# Patient Record
Sex: MALE | URBAN - METROPOLITAN AREA
[De-identification: names, ages, dates, MRNs, and addresses within clinical notes are randomized per-mention and may not be internally consistent; named-entity substitution may affect disease eponyms.]

---

## 2017-09-16 ENCOUNTER — HOSPITAL ENCOUNTER (OUTPATIENT)
Dept: MEDSURG UNIT | Facility: HOSPITAL | Age: 42
End: 2017-09-18
Attending: INTERNAL MEDICINE | Admitting: INTERNAL MEDICINE

## 2017-09-16 LAB
ABS NEUT (OLG): 1.99 X10(3)/MCL
ALBUMIN SERPL-MCNC: 3.7 GM/DL (ref 3.4–5)
ALBUMIN/GLOB SERPL: 1 RATIO (ref 1–2)
ALP SERPL-CCNC: 71 UNIT/L (ref 45–117)
ALT SERPL-CCNC: 39 UNIT/L (ref 12–78)
ANISOCYTOSIS BLD QL SMEAR: ABNORMAL
AST SERPL-CCNC: 59 UNIT/L (ref 15–37)
BASOPHILS NFR BLD MANUAL: 3 %
BILIRUB SERPL-MCNC: 0.8 MG/DL (ref 0.2–1)
BILIRUBIN DIRECT+TOT PNL SERPL-MCNC: 0.3 MG/DL
BILIRUBIN DIRECT+TOT PNL SERPL-MCNC: 0.5 MG/DL
BUN SERPL-MCNC: 3 MG/DL (ref 7–18)
BUN SERPL-MCNC: 4 MG/DL (ref 7–18)
CALCIUM SERPL-MCNC: 8 MG/DL (ref 8.5–10.1)
CALCIUM SERPL-MCNC: 8.1 MG/DL (ref 8.5–10.1)
CHLORIDE SERPL-SCNC: 105 MMOL/L (ref 98–107)
CHLORIDE SERPL-SCNC: 106 MMOL/L (ref 98–107)
CO2 SERPL-SCNC: 22 MMOL/L (ref 21–32)
CO2 SERPL-SCNC: 25 MMOL/L (ref 21–32)
COLOR STL: NORMAL
CONSISTENCY STL: NORMAL
CREAT SERPL-MCNC: 0.6 MG/DL (ref 0.6–1.3)
CREAT SERPL-MCNC: 0.6 MG/DL (ref 0.6–1.3)
CROSSMATCH INTERPRETATION: NORMAL
CROSSMATCH INTERPRETATION: NORMAL
EOSINOPHIL NFR BLD MANUAL: 7 %
ERYTHROCYTE [DISTWIDTH] IN BLOOD BY AUTOMATED COUNT: 23.7 % (ref 11.5–14.5)
GLOBULIN SER-MCNC: 3.7 GM/ML (ref 2.3–3.5)
GLUCOSE SERPL-MCNC: 101 MG/DL (ref 74–106)
GLUCOSE SERPL-MCNC: 112 MG/DL (ref 74–106)
GRANULOCYTES NFR BLD MANUAL: 41 % (ref 43–75)
HCT VFR BLD AUTO: 19.8 % (ref 40–51)
HEMOCCULT SP1 STL QL: NEGATIVE
HGB BLD-MCNC: 4.6 GM/DL (ref 13.5–17.5)
HYPOCHROMIA BLD QL SMEAR: ABNORMAL
LYMPHOCYTES NFR BLD MANUAL: 34 % (ref 20.5–51.1)
MCH RBC QN AUTO: 12.9 PG (ref 26–34)
MCHC RBC AUTO-ENTMCNC: 23.2 GM/DL (ref 31–37)
MCV RBC AUTO: 55.6 FL (ref 80–100)
MONOCYTES NFR BLD MANUAL: 13 % (ref 2–9)
MYELOCYTES NFR BLD MANUAL: 1 %
NEUTS BAND NFR BLD MANUAL: 1 % (ref 0–10)
PLATELET # BLD AUTO: 184 X10(3)/MCL (ref 130–400)
PLATELET # BLD EST: ADEQUATE 10*3/UL
PMV BLD AUTO: 8.6 FL (ref 7.4–10.4)
POIKILOCYTOSIS BLD QL SMEAR: ABNORMAL
POLYCHROMASIA BLD QL SMEAR: ABNORMAL
POTASSIUM SERPL-SCNC: 3.8 MMOL/L (ref 3.5–5.1)
POTASSIUM SERPL-SCNC: 3.9 MMOL/L (ref 3.5–5.1)
PRODUCT READY: NORMAL
PROT SERPL-MCNC: 7.4 GM/DL (ref 6.4–8.2)
RBC # BLD AUTO: 3.56 X10(6)/MCL (ref 4.5–5.9)
RBC MORPH BLD: ABNORMAL
SODIUM SERPL-SCNC: 137 MMOL/L (ref 136–145)
SODIUM SERPL-SCNC: 138 MMOL/L (ref 136–145)
TRANSFUSION ORDER: NORMAL
TRANSFUSION ORDER: NORMAL
WBC # SPEC AUTO: 4.6 X10(3)/MCL (ref 4.5–11)

## 2017-09-17 LAB
ABS NEUT (OLG): 2.06 X10(3)/MCL (ref 2.1–9.2)
ALBUMIN SERPL-MCNC: 3.6 GM/DL (ref 3.4–5)
ALBUMIN/GLOB SERPL: 1 RATIO (ref 1–2)
ALP SERPL-CCNC: 66 UNIT/L (ref 45–117)
ALT SERPL-CCNC: 34 UNIT/L (ref 12–78)
AST SERPL-CCNC: 30 UNIT/L (ref 15–37)
BASOPHILS # BLD AUTO: 0.06 X10(3)/MCL
BASOPHILS NFR BLD AUTO: 2 % (ref 0–1)
BILIRUB SERPL-MCNC: 1.9 MG/DL (ref 0.2–1)
BILIRUBIN DIRECT+TOT PNL SERPL-MCNC: 0.7 MG/DL
BILIRUBIN DIRECT+TOT PNL SERPL-MCNC: 1.2 MG/DL
BUN SERPL-MCNC: 5 MG/DL (ref 7–18)
CALCIUM SERPL-MCNC: 8.5 MG/DL (ref 8.5–10.1)
CHLORIDE SERPL-SCNC: 107 MMOL/L (ref 98–107)
CO2 SERPL-SCNC: 26 MMOL/L (ref 21–32)
COLOR STL: NORMAL
CONSISTENCY STL: NORMAL
CREAT SERPL-MCNC: 0.5 MG/DL (ref 0.6–1.3)
EOSINOPHIL # BLD AUTO: 0.15 X10(3)/MCL
EOSINOPHIL NFR BLD AUTO: 4 % (ref 0–5)
ERYTHROCYTE [DISTWIDTH] IN BLOOD BY AUTOMATED COUNT: 28.8 % (ref 11.5–14.5)
FERRITIN SERPL-MCNC: 3.8 NG/ML (ref 26–388)
GLOBULIN SER-MCNC: 3.5 GM/ML (ref 2.3–3.5)
GLUCOSE SERPL-MCNC: 74 MG/DL (ref 74–106)
HAV IGM SERPL QL IA: NONREACTIVE
HBV CORE IGM SERPL QL IA: NONREACTIVE
HBV SURFACE AG SERPL QL IA: NEGATIVE
HCT VFR BLD AUTO: 26.1 % (ref 40–51)
HCT VFR BLD AUTO: 27.6 % (ref 40–51)
HCV AB SERPL QL IA: NONREACTIVE
HEMOCCULT SP2 STL QL: NEGATIVE
HGB BLD-MCNC: 6.9 GM/DL (ref 13.5–17.5)
HGB BLD-MCNC: 7.6 GM/DL (ref 13.5–17.5)
HIV 1+2 AB+HIV1 P24 AG SERPL QL IA: NONREACTIVE
IMM GRANULOCYTES # BLD AUTO: 0.06 10*3/UL
IMM GRANULOCYTES NFR BLD AUTO: 2 %
IRON SATN MFR SERPL: 6.4 % (ref 15–50)
IRON SERPL-MCNC: 26 MCG/DL (ref 65–175)
LYMPHOCYTES # BLD AUTO: 0.86 X10(3)/MCL
LYMPHOCYTES NFR BLD AUTO: 22 % (ref 15–40)
MCH RBC QN AUTO: 16.1 PG (ref 26–34)
MCHC RBC AUTO-ENTMCNC: 26.4 GM/DL (ref 31–37)
MCV RBC AUTO: 61 FL (ref 80–100)
MONOCYTES # BLD AUTO: 0.63 X10(3)/MCL
MONOCYTES NFR BLD AUTO: 16 % (ref 4–12)
NEUTROPHILS # BLD AUTO: 2.06 X10(3)/MCL
NEUTROPHILS NFR BLD AUTO: 54 X10(3)/MCL
PLATELET # BLD AUTO: 179 X10(3)/MCL (ref 130–400)
PMV BLD AUTO: 8.7 FL (ref 7.4–10.4)
POTASSIUM SERPL-SCNC: 3.6 MMOL/L (ref 3.5–5.1)
PROT SERPL-MCNC: 7.1 GM/DL (ref 6.4–8.2)
RBC # BLD AUTO: 4.28 X10(6)/MCL (ref 4.5–5.9)
RET# (OHS): 0.04 X10(6)/MCL (ref 0.02–0.09)
RETICULOCYTE COUNT AUTOMATED (OLG): 1 % (ref 0.5–1.5)
SODIUM SERPL-SCNC: 143 MMOL/L (ref 136–145)
TIBC SERPL-MCNC: 405 MCG/DL (ref 250–450)
TRANSFERRIN SERPL-MCNC: 321 MG/DL (ref 200–360)
WBC # SPEC AUTO: 3.8 X10(3)/MCL (ref 4.5–11)

## 2017-09-18 LAB
ALBUMIN SERPL-MCNC: 3.3 GM/DL (ref 3.4–5)
ALBUMIN/GLOB SERPL: 1 RATIO (ref 1–2)
ALP SERPL-CCNC: 65 UNIT/L (ref 45–117)
ALT SERPL-CCNC: 29 UNIT/L (ref 12–78)
AST SERPL-CCNC: 24 UNIT/L (ref 15–37)
BILIRUB SERPL-MCNC: 1.5 MG/DL (ref 0.2–1)
BILIRUBIN DIRECT+TOT PNL SERPL-MCNC: 0.5 MG/DL
BILIRUBIN DIRECT+TOT PNL SERPL-MCNC: 1 MG/DL
BUN SERPL-MCNC: 6 MG/DL (ref 7–18)
CALCIUM SERPL-MCNC: 8.5 MG/DL (ref 8.5–10.1)
CHLORIDE SERPL-SCNC: 105 MMOL/L (ref 98–107)
CO2 SERPL-SCNC: 28 MMOL/L (ref 21–32)
CREAT SERPL-MCNC: 0.6 MG/DL (ref 0.6–1.3)
GLOBULIN SER-MCNC: 3.3 GM/ML (ref 2.3–3.5)
GLUCOSE SERPL-MCNC: 90 MG/DL (ref 74–106)
POTASSIUM SERPL-SCNC: 3.8 MMOL/L (ref 3.5–5.1)
PROT SERPL-MCNC: 6.6 GM/DL (ref 6.4–8.2)
SODIUM SERPL-SCNC: 139 MMOL/L (ref 136–145)

## 2017-09-28 ENCOUNTER — HISTORICAL (OUTPATIENT)
Dept: INTERNAL MEDICINE | Facility: CLINIC | Age: 42
End: 2017-09-28

## 2017-09-28 LAB
ANISOCYTOSIS BLD QL SMEAR: ABNORMAL
BASOPHILS NFR BLD MANUAL: 5 %
EOSINOPHIL NFR BLD MANUAL: 4 %
ERYTHROCYTE [DISTWIDTH] IN BLOOD BY AUTOMATED COUNT: 31.7 % (ref 11.5–14.5)
FOLATE SERPL-MCNC: 11.8 NG/ML (ref 3.1–17.5)
GRANULOCYTES NFR BLD MANUAL: 51 % (ref 43–75)
HCT VFR BLD AUTO: 31.5 % (ref 40–51)
HGB BLD-MCNC: 8.6 GM/DL (ref 13.5–17.5)
HYPOCHROMIA BLD QL SMEAR: ABNORMAL
LYMPHOCYTES NFR BLD MANUAL: 32 % (ref 20.5–51.1)
MCH RBC QN AUTO: 18.6 PG (ref 26–34)
MCHC RBC AUTO-ENTMCNC: 27.3 GM/DL (ref 31–37)
MCV RBC AUTO: 68 FL (ref 80–100)
MONOCYTES NFR BLD MANUAL: 8 % (ref 2–9)
PLATELET # BLD AUTO: 322 X10(3)/MCL (ref 130–400)
PLATELET # BLD EST: ADEQUATE 10*3/UL
PMV BLD AUTO: 8.5 FL (ref 7.4–10.4)
POIKILOCYTOSIS BLD QL SMEAR: ABNORMAL
POLYCHROMASIA BLD QL SMEAR: ABNORMAL
RBC # BLD AUTO: 4.63 X10(6)/MCL (ref 4.5–5.9)
RBC MORPH BLD: ABNORMAL
VIT B12 SERPL-MCNC: 847 PG/ML (ref 193–986)
WBC # SPEC AUTO: 4.4 X10(3)/MCL (ref 4.5–11)

## 2017-10-24 ENCOUNTER — HISTORICAL (OUTPATIENT)
Dept: ENDOSCOPY | Facility: HOSPITAL | Age: 42
End: 2017-10-24

## 2017-12-04 LAB
COLOR STL: NORMAL
CONSISTENCY STL: NORMAL
HEMOCCULT SP1 STL QL: NEGATIVE

## 2017-12-05 LAB
COLOR STL: NORMAL
CONSISTENCY STL: NORMAL
HEMOCCULT SP2 STL QL: NEGATIVE

## 2017-12-06 LAB
COLOR STL: NORMAL
CONSISTENCY STL: NORMAL

## 2017-12-07 ENCOUNTER — HISTORICAL (OUTPATIENT)
Dept: INTERNAL MEDICINE | Facility: CLINIC | Age: 42
End: 2017-12-07

## 2017-12-07 LAB
ABS NEUT (OLG): 3.22 X10(3)/MCL (ref 2.1–9.2)
ANISOCYTOSIS BLD QL SMEAR: NORMAL
BASOPHILS NFR BLD MANUAL: 0 %
EOSINOPHIL NFR BLD MANUAL: 2 %
ERYTHROCYTE [DISTWIDTH] IN BLOOD BY AUTOMATED COUNT: 24.2 % (ref 11.5–14.5)
FERRITIN SERPL-MCNC: 17.4 NG/ML (ref 26–388)
GRANULOCYTES NFR BLD MANUAL: 57 % (ref 43–75)
HCT VFR BLD AUTO: 39.5 % (ref 40–51)
HGB BLD-MCNC: 12.1 GM/DL (ref 13.5–17.5)
HYPOCHROMIA BLD QL SMEAR: NORMAL
LYMPHOCYTES NFR BLD MANUAL: 38 % (ref 20.5–51.1)
MCH RBC QN AUTO: 19.5 PG (ref 26–34)
MCHC RBC AUTO-ENTMCNC: 30.6 GM/DL (ref 31–37)
MCV RBC AUTO: 63.8 FL (ref 80–100)
MICROCYTES BLD QL SMEAR: NORMAL
MONOCYTES NFR BLD MANUAL: 2 % (ref 2–9)
NEUTS BAND NFR BLD MANUAL: 1 % (ref 0–10)
PLATELET # BLD AUTO: 112 X10(3)/MCL (ref 130–400)
PLATELET # BLD EST: ADEQUATE 10*3/UL
PMV BLD AUTO: ABNORMAL FL (ref 7.4–10.4)
POIKILOCYTOSIS BLD QL SMEAR: NORMAL
RBC # BLD AUTO: 6.19 X10(6)/MCL (ref 4.5–5.9)
RBC MORPH BLD: NORMAL
WBC # SPEC AUTO: 4.8 X10(3)/MCL (ref 4.5–11)

## 2017-12-28 ENCOUNTER — HISTORICAL (OUTPATIENT)
Dept: ADMINISTRATIVE | Facility: HOSPITAL | Age: 42
End: 2017-12-28

## 2017-12-28 LAB
ABS NEUT (OLG): 4.13 X10(3)/MCL (ref 2.1–9.2)
ALBUMIN SERPL-MCNC: 4 GM/DL (ref 3.4–5)
ALBUMIN/GLOB SERPL: 1 RATIO (ref 1–2)
ALP SERPL-CCNC: 94 UNIT/L (ref 45–117)
ALT SERPL-CCNC: 39 UNIT/L (ref 12–78)
APPEARANCE, UA: CLEAR
AST SERPL-CCNC: 29 UNIT/L (ref 15–37)
BACTERIA #/AREA URNS AUTO: ABNORMAL /[HPF]
BASOPHILS # BLD AUTO: 0.07 X10(3)/MCL
BASOPHILS NFR BLD AUTO: 1 % (ref 0–1)
BILIRUB SERPL-MCNC: 0.6 MG/DL (ref 0.2–1)
BILIRUB UR QL STRIP: NEGATIVE
BILIRUBIN DIRECT+TOT PNL SERPL-MCNC: 0.2 MG/DL
BILIRUBIN DIRECT+TOT PNL SERPL-MCNC: 0.4 MG/DL
BUN SERPL-MCNC: 10 MG/DL (ref 7–18)
CALCIUM SERPL-MCNC: 8.9 MG/DL (ref 8.5–10.1)
CHLORIDE SERPL-SCNC: 103 MMOL/L (ref 98–107)
CHOLEST SERPL-MCNC: 202 MG/DL
CHOLEST/HDLC SERPL: 2.8 {RATIO} (ref 0–5)
CO2 SERPL-SCNC: 29 MMOL/L (ref 21–32)
COLOR UR: YELLOW
CREAT SERPL-MCNC: 0.8 MG/DL (ref 0.6–1.3)
DEPRECATED CALCIDIOL+CALCIFEROL SERPL-MC: 46.91 NG/ML (ref 30–80)
EOSINOPHIL # BLD AUTO: 0.05 X10(3)/MCL
EOSINOPHIL NFR BLD AUTO: 1 % (ref 0–5)
ERYTHROCYTE [DISTWIDTH] IN BLOOD BY AUTOMATED COUNT: 23.2 % (ref 11.5–14.5)
EST. AVERAGE GLUCOSE BLD GHB EST-MCNC: 100 MG/DL
FERRITIN SERPL-MCNC: 4.7 NG/ML (ref 26–388)
GLOBULIN SER-MCNC: 4.2 GM/ML (ref 2.3–3.5)
GLUCOSE (UA): NORMAL
GLUCOSE SERPL-MCNC: 121 MG/DL (ref 74–106)
HBA1C MFR BLD: 5.1 % (ref 4.2–6.3)
HCT VFR BLD AUTO: 40.1 % (ref 40–51)
HDLC SERPL-MCNC: 71 MG/DL
HGB BLD-MCNC: 12.2 GM/DL (ref 13.5–17.5)
HGB UR QL STRIP: NEGATIVE
HYALINE CASTS #/AREA URNS LPF: ABNORMAL /[LPF]
IMM GRANULOCYTES # BLD AUTO: 0.02 10*3/UL
IMM GRANULOCYTES NFR BLD AUTO: 0 %
IRON SATN MFR SERPL: 9.3 % (ref 15–50)
IRON SERPL-MCNC: 45 MCG/DL (ref 65–175)
KETONES UR QL STRIP: NEGATIVE
LDLC SERPL CALC-MCNC: 102 MG/DL (ref 0–130)
LEUKOCYTE ESTERASE UR QL STRIP: NEGATIVE
LYMPHOCYTES # BLD AUTO: 1.38 X10(3)/MCL
LYMPHOCYTES NFR BLD AUTO: 22 % (ref 15–40)
MCH RBC QN AUTO: 20.2 PG (ref 26–34)
MCHC RBC AUTO-ENTMCNC: 30.4 GM/DL (ref 31–37)
MCV RBC AUTO: 66.4 FL (ref 80–100)
MONOCYTES # BLD AUTO: 0.64 X10(3)/MCL
MONOCYTES NFR BLD AUTO: 10 % (ref 4–12)
NEUTROPHILS # BLD AUTO: 4.13 X10(3)/MCL
NEUTROPHILS NFR BLD AUTO: 66 X10(3)/MCL
NITRITE UR QL STRIP: NEGATIVE
PH UR STRIP: 6.5 [PH] (ref 4.5–8)
PLATELET # BLD AUTO: 377 X10(3)/MCL (ref 130–400)
PMV BLD AUTO: 8.7 FL (ref 7.4–10.4)
POTASSIUM SERPL-SCNC: 3.9 MMOL/L (ref 3.5–5.1)
PROT SERPL-MCNC: 8.2 GM/DL (ref 6.4–8.2)
PROT UR QL STRIP: 300 MG/DL
RBC # BLD AUTO: 6.04 X10(6)/MCL (ref 4.5–5.9)
RBC #/AREA URNS AUTO: ABNORMAL /[HPF]
SODIUM SERPL-SCNC: 138 MMOL/L (ref 136–145)
SP GR UR STRIP: 1.01 (ref 1–1.03)
SQUAMOUS #/AREA URNS LPF: ABNORMAL /[LPF]
T4 SERPL-MCNC: 8.2 MCG/DL (ref 4.5–12.1)
TIBC SERPL-MCNC: 482 MCG/DL (ref 250–450)
TRANSFERRIN SERPL-MCNC: 343 MG/DL (ref 200–360)
TRIGL SERPL-MCNC: 147 MG/DL
TSH SERPL-ACNC: 2.4 MIU/L (ref 0.36–3.74)
UROBILINOGEN UR STRIP-ACNC: NORMAL
VIT B12 SERPL-MCNC: 657 PG/ML (ref 193–986)
VLDLC SERPL CALC-MCNC: 29 MG/DL
WBC # SPEC AUTO: 6.3 X10(3)/MCL (ref 4.5–11)
WBC #/AREA URNS AUTO: ABNORMAL /HPF

## 2018-01-02 ENCOUNTER — HISTORICAL (OUTPATIENT)
Dept: INTERNAL MEDICINE | Facility: CLINIC | Age: 43
End: 2018-01-02

## 2018-01-02 LAB
COLOR STL: ABNORMAL
CONSISTENCY STL: ABNORMAL
HEMOCCULT SP1 STL QL: POSITIVE

## 2018-03-05 ENCOUNTER — HISTORICAL (OUTPATIENT)
Dept: INTERNAL MEDICINE | Facility: CLINIC | Age: 43
End: 2018-03-05

## 2018-03-05 LAB
ABS NEUT (OLG): 3.9 X10(3)/MCL (ref 2.1–9.2)
BASOPHILS # BLD AUTO: 0.09 X10(3)/MCL
BASOPHILS NFR BLD AUTO: 1 %
EOSINOPHIL # BLD AUTO: 0.37 10*3/UL
EOSINOPHIL NFR BLD AUTO: 5 %
ERYTHROCYTE [DISTWIDTH] IN BLOOD BY AUTOMATED COUNT: 18.6 % (ref 11.5–14.5)
FERRITIN SERPL-MCNC: 3.2 NG/ML (ref 26–388)
HCT VFR BLD AUTO: 36.9 % (ref 40–51)
HGB BLD-MCNC: 11.1 GM/DL (ref 13.5–17.5)
IMM GRANULOCYTES # BLD AUTO: 0.02 10*3/UL
IMM GRANULOCYTES NFR BLD AUTO: 0 %
IRON SATN MFR SERPL: 3.2 % (ref 15–50)
IRON SERPL-MCNC: 15 MCG/DL (ref 65–175)
LYMPHOCYTES # BLD AUTO: 2.41 X10(3)/MCL
LYMPHOCYTES NFR BLD AUTO: 31 % (ref 13–40)
MCH RBC QN AUTO: 20 PG (ref 26–34)
MCHC RBC AUTO-ENTMCNC: 30.1 GM/DL (ref 31–37)
MCV RBC AUTO: 66.4 FL (ref 80–100)
MONOCYTES # BLD AUTO: 0.88 X10(3)/MCL
MONOCYTES NFR BLD AUTO: 12 % (ref 4–12)
NEUTROPHILS # BLD AUTO: 3.9 X10(3)/MCL
NEUTROPHILS NFR BLD AUTO: 51 X10(3)/MCL
PLATELET # BLD AUTO: 371 X10(3)/MCL (ref 130–400)
PMV BLD AUTO: 9.3 FL (ref 7.4–10.4)
RBC # BLD AUTO: 5.56 X10(6)/MCL (ref 4.5–5.9)
TIBC SERPL-MCNC: 465 MCG/DL (ref 250–450)
TRANSFERRIN SERPL-MCNC: 337 MG/DL (ref 200–360)
WBC # SPEC AUTO: 7.7 X10(3)/MCL (ref 4.5–11)

## 2018-06-25 ENCOUNTER — HISTORICAL (OUTPATIENT)
Dept: INTERNAL MEDICINE | Facility: CLINIC | Age: 43
End: 2018-06-25

## 2018-06-25 LAB
ABS NEUT (OLG): 1.53 X10(3)/MCL (ref 2.1–9.2)
BASOPHILS # BLD AUTO: 0.08 X10(3)/MCL
BASOPHILS NFR BLD AUTO: 2 %
EOSINOPHIL # BLD AUTO: 0.2 X10(3)/MCL
EOSINOPHIL NFR BLD AUTO: 5 %
ERYTHROCYTE [DISTWIDTH] IN BLOOD BY AUTOMATED COUNT: 19 % (ref 11.5–14.5)
FERRITIN SERPL-MCNC: 2.6 NG/ML (ref 26–388)
HCT VFR BLD AUTO: 32 % (ref 40–51)
HGB BLD-MCNC: 8.5 GM/DL (ref 13.5–17.5)
IMM GRANULOCYTES # BLD AUTO: 0.01 10*3/UL
IMM GRANULOCYTES NFR BLD AUTO: 0 %
IRON SATN MFR SERPL: 2.3 % (ref 15–50)
IRON SERPL-MCNC: 11 MCG/DL (ref 65–175)
LYMPHOCYTES # BLD AUTO: 1.49 X10(3)/MCL
LYMPHOCYTES NFR BLD AUTO: 38 % (ref 13–40)
MCH RBC QN AUTO: 16.5 PG (ref 26–34)
MCHC RBC AUTO-ENTMCNC: 26.6 GM/DL (ref 31–37)
MCV RBC AUTO: 62 FL (ref 80–100)
MONOCYTES # BLD AUTO: 0.56 X10(3)/MCL
MONOCYTES NFR BLD AUTO: 14 % (ref 4–12)
NEUTROPHILS # BLD AUTO: 1.53 X10(3)/MCL
NEUTROPHILS NFR BLD AUTO: 39 X10(3)/MCL
PLATELET # BLD AUTO: 354 X10(3)/MCL (ref 130–400)
PMV BLD AUTO: 8.5 FL (ref 7.4–10.4)
RBC # BLD AUTO: 5.16 X10(6)/MCL (ref 4.5–5.9)
TIBC SERPL-MCNC: 484 MCG/DL (ref 250–450)
TRANSFERRIN SERPL-MCNC: 378 MG/DL (ref 200–360)
WBC # SPEC AUTO: 3.9 X10(3)/MCL (ref 4.5–11)

## 2018-08-10 ENCOUNTER — HISTORICAL (OUTPATIENT)
Dept: INTERNAL MEDICINE | Facility: CLINIC | Age: 43
End: 2018-08-10

## 2018-08-10 LAB
ABS NEUT (OLG): 3.08 X10(3)/MCL (ref 2.1–9.2)
ANISOCYTOSIS BLD QL SMEAR: ABNORMAL
BASOPHILS NFR BLD MANUAL: 2 %
EOSINOPHIL NFR BLD MANUAL: 0 %
ERYTHROCYTE [DISTWIDTH] IN BLOOD BY AUTOMATED COUNT: ABNORMAL % (ref 11.5–14.5)
GRANULOCYTES NFR BLD MANUAL: 56 % (ref 43–75)
HCT VFR BLD AUTO: 40 % (ref 40–51)
HGB BLD-MCNC: 12 GM/DL (ref 13.5–17.5)
HYPOCHROMIA BLD QL SMEAR: ABNORMAL
LYMPHOCYTES NFR BLD MANUAL: 26 % (ref 20.5–51.1)
MCH RBC QN AUTO: 22.5 PG (ref 26–34)
MCHC RBC AUTO-ENTMCNC: 30 GM/DL (ref 31–37)
MCV RBC AUTO: 74.9 FL (ref 80–100)
MONOCYTES NFR BLD MANUAL: 6 % (ref 2–9)
NEUTS BAND NFR BLD MANUAL: 10 % (ref 0–10)
PLATELET # BLD AUTO: 150 X10(3)/MCL (ref 130–400)
PLATELET # BLD EST: ADEQUATE 10*3/UL
PMV BLD AUTO: ABNORMAL FL (ref 7.4–10.4)
RBC # BLD AUTO: 5.34 X10(6)/MCL (ref 4.5–5.9)
RBC MORPH BLD: ABNORMAL
WBC # SPEC AUTO: 5 X10(3)/MCL (ref 4.5–11)

## 2018-10-16 ENCOUNTER — HISTORICAL (OUTPATIENT)
Dept: INTERNAL MEDICINE | Facility: CLINIC | Age: 43
End: 2018-10-16

## 2018-10-16 LAB
ABS NEUT (OLG): 2.89 X10(3)/MCL (ref 2.1–9.2)
BASOPHILS # BLD AUTO: 0.06 X10(3)/MCL
BASOPHILS NFR BLD AUTO: 1 %
EOSINOPHIL # BLD AUTO: 0.13 X10(3)/MCL
EOSINOPHIL NFR BLD AUTO: 2 %
ERYTHROCYTE [DISTWIDTH] IN BLOOD BY AUTOMATED COUNT: 19.4 % (ref 11.5–14.5)
HCT VFR BLD AUTO: 46.8 % (ref 40–51)
HGB BLD-MCNC: 15.1 GM/DL (ref 13.5–17.5)
IMM GRANULOCYTES # BLD AUTO: 0.01 10*3/UL
IMM GRANULOCYTES NFR BLD AUTO: 0 %
LYMPHOCYTES # BLD AUTO: 1.52 X10(3)/MCL
LYMPHOCYTES NFR BLD AUTO: 28 % (ref 13–40)
MCH RBC QN AUTO: 26.8 PG (ref 26–34)
MCHC RBC AUTO-ENTMCNC: 32.3 GM/DL (ref 31–37)
MCV RBC AUTO: 83 FL (ref 80–100)
MONOCYTES # BLD AUTO: 0.74 X10(3)/MCL
MONOCYTES NFR BLD AUTO: 14 % (ref 4–12)
NEUTROPHILS # BLD AUTO: 2.89 X10(3)/MCL
NEUTROPHILS NFR BLD AUTO: 54 X10(3)/MCL
PLATELET # BLD AUTO: 185 X10(3)/MCL (ref 130–400)
PMV BLD AUTO: 8.9 FL (ref 7.4–10.4)
RBC # BLD AUTO: 5.64 X10(6)/MCL (ref 4.5–5.9)
WBC # SPEC AUTO: 5.4 X10(3)/MCL (ref 4.5–11)

## 2019-04-02 ENCOUNTER — HISTORICAL (OUTPATIENT)
Dept: INTERNAL MEDICINE | Facility: CLINIC | Age: 44
End: 2019-04-02

## 2019-04-02 LAB
ABS NEUT (OLG): 2.91 X10(3)/MCL (ref 2.1–9.2)
ALBUMIN SERPL-MCNC: 3.8 GM/DL (ref 3.4–5)
ALBUMIN/GLOB SERPL: 0.9 RATIO (ref 1.1–2)
ALP SERPL-CCNC: 125 UNIT/L (ref 45–117)
ALT SERPL-CCNC: 76 UNIT/L (ref 12–78)
APPEARANCE, UA: CLEAR
AST SERPL-CCNC: 94 UNIT/L (ref 15–37)
BACTERIA #/AREA URNS AUTO: ABNORMAL /[HPF]
BASOPHILS # BLD AUTO: 0.07 X10(3)/MCL
BASOPHILS NFR BLD AUTO: 1 %
BILIRUB SERPL-MCNC: 0.7 MG/DL (ref 0.2–1)
BILIRUB UR QL STRIP: NEGATIVE
BILIRUBIN DIRECT+TOT PNL SERPL-MCNC: 0.3 MG/DL
BILIRUBIN DIRECT+TOT PNL SERPL-MCNC: 0.4 MG/DL
BUN SERPL-MCNC: 9 MG/DL (ref 7–18)
CALCIUM SERPL-MCNC: 9 MG/DL (ref 8.5–10.1)
CHLORIDE SERPL-SCNC: 105 MMOL/L (ref 98–107)
CHOLEST SERPL-MCNC: 224 MG/DL
CHOLEST/HDLC SERPL: 2.2 {RATIO} (ref 0–5)
CO2 SERPL-SCNC: 27 MMOL/L (ref 21–32)
COLOR UR: YELLOW
CREAT SERPL-MCNC: 0.8 MG/DL (ref 0.6–1.3)
DEPRECATED CALCIDIOL+CALCIFEROL SERPL-MC: 20.35 NG/ML (ref 30–80)
EOSINOPHIL # BLD AUTO: 0.05 X10(3)/MCL
EOSINOPHIL NFR BLD AUTO: 1 %
ERYTHROCYTE [DISTWIDTH] IN BLOOD BY AUTOMATED COUNT: 23.8 % (ref 11.5–14.5)
EST. AVERAGE GLUCOSE BLD GHB EST-MCNC: 74 MG/DL
FERRITIN SERPL-MCNC: 56.9 NG/ML (ref 26–388)
GLOBULIN SER-MCNC: 4.4 GM/ML (ref 2.3–3.5)
GLUCOSE (UA): NORMAL
GLUCOSE SERPL-MCNC: 108 MG/DL (ref 74–106)
HBA1C MFR BLD: 4.2 % (ref 4.2–6.3)
HCT VFR BLD AUTO: 36.5 % (ref 40–51)
HDLC SERPL-MCNC: 101 MG/DL
HGB BLD-MCNC: 10.3 GM/DL (ref 13.5–17.5)
HGB UR QL STRIP: NEGATIVE
HYALINE CASTS #/AREA URNS LPF: ABNORMAL /[LPF]
IMM GRANULOCYTES # BLD AUTO: 0.02 10*3/UL
IMM GRANULOCYTES NFR BLD AUTO: 0 %
IRON SATN MFR SERPL: 2.9 % (ref 15–50)
IRON SERPL-MCNC: 12 MCG/DL (ref 65–175)
KETONES UR QL STRIP: ABNORMAL
LDLC SERPL CALC-MCNC: 108 MG/DL (ref 0–130)
LEUKOCYTE ESTERASE UR QL STRIP: NEGATIVE
LYMPHOCYTES # BLD AUTO: 1.02 X10(3)/MCL
LYMPHOCYTES NFR BLD AUTO: 21 % (ref 13–40)
MCH RBC QN AUTO: 20.4 PG (ref 26–34)
MCHC RBC AUTO-ENTMCNC: 28.2 GM/DL (ref 31–37)
MCV RBC AUTO: 72.1 FL (ref 80–100)
MONOCYTES # BLD AUTO: 0.77 X10(3)/MCL
MONOCYTES NFR BLD AUTO: 16 % (ref 4–12)
NEUTROPHILS # BLD AUTO: 2.91 X10(3)/MCL
NEUTROPHILS NFR BLD AUTO: 60 X10(3)/MCL
NITRITE UR QL STRIP: NEGATIVE
PH UR STRIP: 6.5 [PH] (ref 4.5–8)
PLATELET # BLD AUTO: 260 X10(3)/MCL (ref 130–400)
PMV BLD AUTO: 8 FL (ref 7.4–10.4)
POTASSIUM SERPL-SCNC: 3.9 MMOL/L (ref 3.5–5.1)
PROT SERPL-MCNC: 8.2 GM/DL (ref 6.4–8.2)
PROT UR QL STRIP: >600 MG/DL
RBC # BLD AUTO: 5.06 X10(6)/MCL (ref 4.5–5.9)
RBC #/AREA URNS AUTO: ABNORMAL /[HPF]
SODIUM SERPL-SCNC: 140 MMOL/L (ref 136–145)
SP GR UR STRIP: 1.02 (ref 1–1.03)
SQUAMOUS #/AREA URNS LPF: ABNORMAL /[LPF]
TIBC SERPL-MCNC: 408 MCG/DL (ref 250–450)
TRANSFERRIN SERPL-MCNC: 306 MG/DL (ref 200–360)
TRIGL SERPL-MCNC: 77 MG/DL
TSH SERPL-ACNC: 1.85 MIU/L (ref 0.36–3.74)
UROBILINOGEN UR STRIP-ACNC: NORMAL
VLDLC SERPL CALC-MCNC: 15 MG/DL
WBC # SPEC AUTO: 4.8 X10(3)/MCL (ref 4.5–11)
WBC #/AREA URNS AUTO: ABNORMAL /HPF

## 2019-07-02 ENCOUNTER — HISTORICAL (OUTPATIENT)
Dept: ADMINISTRATIVE | Facility: HOSPITAL | Age: 44
End: 2019-07-02

## 2019-07-02 ENCOUNTER — HOSPITAL ENCOUNTER (OUTPATIENT)
Dept: MEDSURG UNIT | Facility: HOSPITAL | Age: 44
End: 2019-07-03
Attending: INTERNAL MEDICINE | Admitting: INTERNAL MEDICINE

## 2019-07-02 LAB
ABS NEUT (OLG): 2.23 X10(3)/MCL (ref 2.1–9.2)
ALBUMIN SERPL-MCNC: 3.2 GM/DL (ref 3.4–5)
ALBUMIN/GLOB SERPL: 0.8 RATIO (ref 1.1–2)
ALP SERPL-CCNC: 108 UNIT/L (ref 45–117)
ALT SERPL-CCNC: 65 UNIT/L (ref 12–78)
AMMONIA PLAS-MSCNC: 20 MMOL/L (ref 11–32)
AMPHET UR QL SCN: NEGATIVE
AMYLASE SERPL-CCNC: 104 UNIT/L (ref 25–115)
APPEARANCE, UA: CLEAR
APTT PPP: 31.2 SECOND(S) (ref 23.3–37)
AST SERPL-CCNC: 94 UNIT/L (ref 15–37)
BACTERIA #/AREA URNS AUTO: ABNORMAL /[HPF]
BARBITURATE SCN PRESENT UR: NEGATIVE
BASOPHILS # BLD AUTO: 0.07 X10(3)/MCL
BASOPHILS NFR BLD AUTO: 2 %
BENZODIAZ UR QL SCN: NEGATIVE
BILIRUB SERPL-MCNC: 0.6 MG/DL (ref 0.2–1)
BILIRUB UR QL STRIP: NEGATIVE
BILIRUBIN DIRECT+TOT PNL SERPL-MCNC: 0.2 MG/DL
BILIRUBIN DIRECT+TOT PNL SERPL-MCNC: 0.4 MG/DL
BUN SERPL-MCNC: 4 MG/DL (ref 7–18)
CALCIUM SERPL-MCNC: 8.2 MG/DL (ref 8.5–10.1)
CANNABINOIDS UR QL SCN: NEGATIVE
CHLORIDE SERPL-SCNC: 102 MMOL/L (ref 98–107)
CHOLEST SERPL-MCNC: 197 MG/DL
CHOLEST/HDLC SERPL: 1.7 {RATIO} (ref 0–5)
CO2 SERPL-SCNC: 27 MMOL/L (ref 21–32)
COCAINE UR QL SCN: NEGATIVE
COLOR STL: ABNORMAL
COLOR UR: ABNORMAL
CONSISTENCY STL: ABNORMAL
CREAT SERPL-MCNC: 0.6 MG/DL (ref 0.6–1.3)
CROSSMATCH INTERPRETATION: NORMAL
DEPRECATED CALCIDIOL+CALCIFEROL SERPL-MC: 22.58 NG/ML (ref 30–80)
EOSINOPHIL # BLD AUTO: 0.02 X10(3)/MCL
EOSINOPHIL NFR BLD AUTO: 1 %
ERYTHROCYTE [DISTWIDTH] IN BLOOD BY AUTOMATED COUNT: 22.7 % (ref 11.5–14.5)
ERYTHROCYTE [DISTWIDTH] IN BLOOD BY AUTOMATED COUNT: 27 % (ref 11.5–14.5)
ETHANOL SERPL-MCNC: 49 MG/DL
FERRITIN SERPL-MCNC: 4.4 NG/ML (ref 26–388)
FOLATE SERPL-MCNC: 15.5 NG/ML (ref 3.1–17.5)
GLOBULIN SER-MCNC: 3.9 GM/ML (ref 2.3–3.5)
GLUCOSE (UA): NORMAL
GLUCOSE SERPL-MCNC: 129 MG/DL (ref 74–106)
HAV IGM SERPL QL IA: NONREACTIVE
HBV CORE IGM SERPL QL IA: NONREACTIVE
HBV SURFACE AG SERPL QL IA: NEGATIVE
HCT VFR BLD AUTO: 27.1 % (ref 40–51)
HCT VFR BLD AUTO: 31.8 % (ref 40–51)
HCV AB SERPL QL IA: NONREACTIVE
HDLC SERPL-MCNC: 113 MG/DL
HEMOCCULT SP1 STL QL: POSITIVE
HGB BLD-MCNC: 6.8 GM/DL (ref 13.5–17.5)
HGB BLD-MCNC: 8.7 GM/DL (ref 13.5–17.5)
HGB UR QL STRIP: NEGATIVE
HYALINE CASTS #/AREA URNS LPF: ABNORMAL /[LPF]
IMM GRANULOCYTES # BLD AUTO: 0.02 10*3/UL
IMM GRANULOCYTES NFR BLD AUTO: 1 %
INR PPP: 1.11 (ref 0.9–1.2)
IRON SATN MFR SERPL: 3.1 % (ref 15–50)
IRON SERPL-MCNC: 11 MCG/DL (ref 65–175)
KETONES UR QL STRIP: ABNORMAL
LDLC SERPL CALC-MCNC: 70 MG/DL (ref 0–130)
LEUKOCYTE ESTERASE UR QL STRIP: NEGATIVE
LIPASE SERPL-CCNC: 198 UNIT/L (ref 73–393)
LYMPHOCYTES # BLD AUTO: 0.56 X10(3)/MCL
LYMPHOCYTES NFR BLD AUTO: 16 % (ref 13–40)
MAGNESIUM SERPL-MCNC: 1.4 MG/DL (ref 1.6–2.6)
MCH RBC QN AUTO: 16 PG (ref 26–34)
MCH RBC QN AUTO: 18.6 PG (ref 26–34)
MCHC RBC AUTO-ENTMCNC: 25.1 GM/DL (ref 31–37)
MCHC RBC AUTO-ENTMCNC: 27.4 GM/DL (ref 31–37)
MCV RBC AUTO: 63.8 FL (ref 80–100)
MCV RBC AUTO: 68.1 FL (ref 80–100)
MONOCYTES # BLD AUTO: 0.56 X10(3)/MCL
MONOCYTES NFR BLD AUTO: 16 % (ref 4–12)
NEUTROPHILS # BLD AUTO: 2.23 X10(3)/MCL
NEUTROPHILS NFR BLD AUTO: 64 X10(3)/MCL
NITRITE UR QL STRIP: NEGATIVE
OPIATES UR QL SCN: NEGATIVE
PCP UR QL: NEGATIVE
PH UR STRIP.AUTO: 8 [PH] (ref 5–8)
PH UR STRIP: 8 [PH] (ref 4.5–8)
PLATELET # BLD AUTO: 135 X10(3)/MCL (ref 130–400)
PLATELET # BLD AUTO: 166 X10(3)/MCL (ref 130–400)
PMV BLD AUTO: 8.1 FL (ref 7.4–10.4)
PMV BLD AUTO: 8.2 FL (ref 7.4–10.4)
POTASSIUM SERPL-SCNC: 3.8 MMOL/L (ref 3.5–5.1)
PRODUCT READY: NORMAL
PROT SERPL-MCNC: 7.1 GM/DL (ref 6.4–8.2)
PROT UR QL STRIP: 300 MG/DL
PROTHROMBIN TIME: 14.2 SECOND(S) (ref 11.9–14.4)
RBC # BLD AUTO: 4.25 X10(6)/MCL (ref 4.5–5.9)
RBC # BLD AUTO: 4.67 X10(6)/MCL (ref 4.5–5.9)
RBC #/AREA URNS AUTO: ABNORMAL /[HPF]
RET# (OHS): 0.14 X10(6)/MCL (ref 0.02–0.09)
RETICULOCYTE COUNT AUTOMATED (OLG): 3.2 % (ref 0.5–1.5)
SODIUM SERPL-SCNC: 137 MMOL/L (ref 136–145)
SP GR UR STRIP: 1 (ref 1–1.03)
SQUAMOUS #/AREA URNS LPF: ABNORMAL /[LPF]
TEMPERATURE, URINE (OHS): 25 DEGC (ref 20–25)
TIBC SERPL-MCNC: 359 MCG/DL (ref 250–450)
TRANSFERRIN SERPL-MCNC: 273 MG/DL (ref 200–360)
TRANSFUSION ORDER: NORMAL
TRIGL SERPL-MCNC: 71 MG/DL
UROBILINOGEN UR STRIP-ACNC: NORMAL
VIT B12 SERPL-MCNC: 918 PG/ML (ref 193–986)
VLDLC SERPL CALC-MCNC: 14 MG/DL
WBC # SPEC AUTO: 3.5 X10(3)/MCL (ref 4.5–11)
WBC # SPEC AUTO: 3.7 X10(3)/MCL (ref 4.5–11)
WBC #/AREA URNS AUTO: ABNORMAL /HPF

## 2019-07-03 LAB
ABS NEUT (OLG): 2.66 X10(3)/MCL (ref 2.1–9.2)
ALBUMIN SERPL-MCNC: 2.9 GM/DL (ref 3.4–5)
ALBUMIN/GLOB SERPL: 0.8 RATIO (ref 1.1–2)
ALP SERPL-CCNC: 93 UNIT/L (ref 45–117)
ALT SERPL-CCNC: 52 UNIT/L (ref 12–78)
AST SERPL-CCNC: 65 UNIT/L (ref 15–37)
BASOPHILS # BLD AUTO: 0.07 X10(3)/MCL
BASOPHILS NFR BLD AUTO: 2 %
BILIRUB SERPL-MCNC: 1.1 MG/DL (ref 0.2–1)
BILIRUBIN DIRECT+TOT PNL SERPL-MCNC: 0.4 MG/DL
BILIRUBIN DIRECT+TOT PNL SERPL-MCNC: 0.7 MG/DL
BUN SERPL-MCNC: 4 MG/DL (ref 7–18)
CALCIUM SERPL-MCNC: 8.4 MG/DL (ref 8.5–10.1)
CHLORIDE SERPL-SCNC: 105 MMOL/L (ref 98–107)
CO2 SERPL-SCNC: 27 MMOL/L (ref 21–32)
CREAT SERPL-MCNC: 0.5 MG/DL (ref 0.6–1.3)
CREAT UR-MCNC: 48 MG/DL
EOSINOPHIL # BLD AUTO: 0.06 10*3/UL
EOSINOPHIL NFR BLD AUTO: 2 %
ERYTHROCYTE [DISTWIDTH] IN BLOOD BY AUTOMATED COUNT: 26.6 % (ref 11.5–14.5)
GLOBULIN SER-MCNC: 3.5 GM/ML (ref 2.3–3.5)
GLUCOSE SERPL-MCNC: 109 MG/DL (ref 74–106)
HCT VFR BLD AUTO: 32.7 % (ref 40–51)
HGB BLD-MCNC: 9.1 GM/DL (ref 13.5–17.5)
IMM GRANULOCYTES # BLD AUTO: 0.02 10*3/UL
IMM GRANULOCYTES NFR BLD AUTO: 0 %
LYMPHOCYTES # BLD AUTO: 0.58 X10(3)/MCL
LYMPHOCYTES NFR BLD AUTO: 15 % (ref 13–40)
MAGNESIUM SERPL-MCNC: 2 MG/DL (ref 1.6–2.6)
MCH RBC QN AUTO: 18.5 PG (ref 26–34)
MCHC RBC AUTO-ENTMCNC: 27.8 GM/DL (ref 31–37)
MCV RBC AUTO: 66.6 FL (ref 80–100)
MONOCYTES # BLD AUTO: 0.58 X10(3)/MCL
MONOCYTES NFR BLD AUTO: 15 % (ref 4–12)
NEUTROPHILS # BLD AUTO: 2.66 X10(3)/MCL
NEUTROPHILS NFR BLD AUTO: 67 X10(3)/MCL
PLATELET # BLD AUTO: 155 X10(3)/MCL (ref 130–400)
PMV BLD AUTO: 8.5 FL (ref 7.4–10.4)
POTASSIUM SERPL-SCNC: 3.6 MMOL/L (ref 3.5–5.1)
PROT SERPL-MCNC: 6.4 GM/DL (ref 6.4–8.2)
PROT UR STRIP-MCNC: 246.9 MG/DL
PROT/CREAT UR-RTO: 5143.8 MG/GM
RBC # BLD AUTO: 4.91 X10(6)/MCL (ref 4.5–5.9)
SODIUM SERPL-SCNC: 138 MMOL/L (ref 136–145)
WBC # SPEC AUTO: 4 X10(3)/MCL (ref 4.5–11)

## 2021-08-25 ENCOUNTER — HISTORICAL (OUTPATIENT)
Dept: ADMINISTRATIVE | Facility: HOSPITAL | Age: 46
End: 2021-08-25

## 2021-08-25 LAB
ABS NEUT (OLG): 1.81 X10(3)/MCL (ref 2.1–9.2)
ALBUMIN SERPL-MCNC: 3.8 GM/DL (ref 3.5–5)
ALBUMIN/GLOB SERPL: 0.9 RATIO (ref 1.1–2)
ALP SERPL-CCNC: 112 UNIT/L (ref 40–150)
ALT SERPL-CCNC: 24 UNIT/L (ref 0–55)
APPEARANCE, UA: CLEAR
AST SERPL-CCNC: 58 UNIT/L (ref 5–34)
BACTERIA #/AREA URNS AUTO: ABNORMAL /HPF
BASOPHILS # BLD AUTO: 0.1 X10(3)/MCL (ref 0–0.2)
BASOPHILS NFR BLD AUTO: 3 %
BILIRUB SERPL-MCNC: 0.6 MG/DL
BILIRUB UR QL STRIP: NEGATIVE
BILIRUBIN DIRECT+TOT PNL SERPL-MCNC: 0.3 MG/DL (ref 0–0.5)
BILIRUBIN DIRECT+TOT PNL SERPL-MCNC: 0.3 MG/DL (ref 0–0.8)
BUN SERPL-MCNC: 3.7 MG/DL (ref 8.9–20.6)
CALCIUM SERPL-MCNC: 9.1 MG/DL (ref 8.4–10.2)
CHLORIDE SERPL-SCNC: 105 MMOL/L (ref 98–107)
CO2 SERPL-SCNC: 25 MMOL/L (ref 22–29)
COLOR UR: ABNORMAL
CREAT SERPL-MCNC: 0.8 MG/DL (ref 0.73–1.18)
CREAT UR-MCNC: 42.5 MG/DL (ref 58–161)
EOSINOPHIL # BLD AUTO: 0.1 X10(3)/MCL (ref 0–0.9)
EOSINOPHIL NFR BLD AUTO: 2 %
ERYTHROCYTE [DISTWIDTH] IN BLOOD BY AUTOMATED COUNT: ABNORMAL % (ref 11.5–14.5)
FERRITIN SERPL-MCNC: 29.7 NG/ML (ref 21.81–274.66)
GLOBULIN SER-MCNC: 4.3 GM/DL (ref 2.4–3.5)
GLUCOSE (UA): NEGATIVE
GLUCOSE SERPL-MCNC: 98 MG/DL (ref 74–100)
HCT VFR BLD AUTO: 39 % (ref 40–51)
HGB BLD-MCNC: 11.9 GM/DL (ref 13.5–17.5)
HGB UR QL STRIP: 0.03 MG/DL
HYALINE CASTS #/AREA URNS LPF: ABNORMAL /LPF
IMM GRANULOCYTES # BLD AUTO: 0.02 10*3/UL
IMM GRANULOCYTES NFR BLD AUTO: 0 %
IRON SATN MFR SERPL: 53 % (ref 20–50)
IRON SERPL-MCNC: 172 UG/DL (ref 65–175)
KETONES UR QL STRIP: NEGATIVE
LEUKOCYTE ESTERASE UR QL STRIP: NEGATIVE
LYMPHOCYTES # BLD AUTO: 1.6 X10(3)/MCL (ref 0.6–4.6)
LYMPHOCYTES NFR BLD AUTO: 40 %
MCH RBC QN AUTO: 22.6 PG (ref 26–34)
MCHC RBC AUTO-ENTMCNC: 30.5 GM/DL (ref 31–37)
MCV RBC AUTO: 74.1 FL (ref 80–100)
MONOCYTES # BLD AUTO: 0.4 X10(3)/MCL (ref 0.1–1.3)
MONOCYTES NFR BLD AUTO: 9 %
NEUTROPHILS # BLD AUTO: 1.81 X10(3)/MCL (ref 2.1–9.2)
NEUTROPHILS NFR BLD AUTO: 46 %
NITRITE UR QL STRIP: NEGATIVE
NRBC BLD AUTO-RTO: 0 % (ref 0–0.2)
PH UR STRIP: 5.5 [PH] (ref 4.5–8)
PLATELET # BLD AUTO: 101 X10(3)/MCL (ref 130–400)
PMV BLD AUTO: 8.4 FL (ref 7.4–10.4)
POTASSIUM SERPL-SCNC: 3.9 MMOL/L (ref 3.5–5.1)
PROT SERPL-MCNC: 8.1 GM/DL (ref 6.4–8.3)
PROT UR QL STRIP: 200 MG/DL
PROT UR STRIP-MCNC: 192.9 MG/DL
PROT/CREAT UR-RTO: 4538.8 MG/GM CR
RBC # BLD AUTO: 5.26 X10(6)/MCL (ref 4.5–5.9)
RBC #/AREA URNS AUTO: ABNORMAL /HPF
SODIUM SERPL-SCNC: 142 MMOL/L (ref 136–145)
SP GR UR STRIP: 1 (ref 1–1.03)
SQUAMOUS #/AREA URNS LPF: ABNORMAL /LPF
TIBC SERPL-MCNC: 153 UG/DL (ref 69–240)
TIBC SERPL-MCNC: 325 UG/DL (ref 250–450)
TRANSFERRIN SERPL-MCNC: 269 MG/DL (ref 174–364)
UROBILINOGEN UR STRIP-ACNC: NORMAL
WBC # SPEC AUTO: 4 X10(3)/MCL (ref 4.5–11)
WBC #/AREA URNS AUTO: ABNORMAL /HPF

## 2021-10-11 ENCOUNTER — HISTORICAL (OUTPATIENT)
Dept: NEPHROLOGY | Facility: CLINIC | Age: 46
End: 2021-10-11

## 2021-10-11 LAB
ALBUMIN SERPL-MCNC: 3.5 GM/DL (ref 3.5–5)
ALBUMIN/GLOB SERPL: 0.8 RATIO (ref 1.1–2)
ALP SERPL-CCNC: 152 UNIT/L (ref 40–150)
ALT SERPL-CCNC: 45 UNIT/L (ref 0–55)
AST SERPL-CCNC: 103 UNIT/L (ref 5–34)
BILIRUB SERPL-MCNC: 0.5 MG/DL
BILIRUBIN DIRECT+TOT PNL SERPL-MCNC: 0.2 MG/DL (ref 0–0.8)
BILIRUBIN DIRECT+TOT PNL SERPL-MCNC: 0.3 MG/DL (ref 0–0.5)
BUN SERPL-MCNC: 5.6 MG/DL (ref 8.9–20.6)
CALCIUM SERPL-MCNC: 8.9 MG/DL (ref 8.4–10.2)
CHLORIDE SERPL-SCNC: 102 MMOL/L (ref 98–107)
CO2 SERPL-SCNC: 25 MMOL/L (ref 22–29)
CREAT SERPL-MCNC: 0.64 MG/DL (ref 0.73–1.18)
GLOBULIN SER-MCNC: 4.3 GM/DL (ref 2.4–3.5)
GLUCOSE SERPL-MCNC: 101 MG/DL (ref 74–100)
POTASSIUM SERPL-SCNC: 3.9 MMOL/L (ref 3.5–5.1)
PROT SERPL-MCNC: 7.8 GM/DL (ref 6.4–8.3)
SODIUM SERPL-SCNC: 138 MMOL/L (ref 136–145)

## 2021-11-11 ENCOUNTER — HOSPITAL ENCOUNTER (OUTPATIENT)
Dept: MEDSURG UNIT | Facility: HOSPITAL | Age: 46
End: 2021-11-12
Attending: INTERNAL MEDICINE | Admitting: INTERNAL MEDICINE

## 2021-11-11 LAB
ABS NEUT (OLG): 0.97 X10(3)/MCL (ref 2.1–9.2)
ALBUMIN SERPL-MCNC: 3.8 GM/DL (ref 3.5–5)
ALBUMIN/GLOB SERPL: 1 RATIO (ref 1.1–2)
ALP SERPL-CCNC: 174 UNIT/L (ref 40–150)
ALT SERPL-CCNC: 52 UNIT/L (ref 0–55)
AST SERPL-CCNC: 109 UNIT/L (ref 5–34)
BASOPHILS # BLD AUTO: 0.1 X10(3)/MCL (ref 0–0.2)
BASOPHILS NFR BLD AUTO: 3 %
BILIRUB SERPL-MCNC: 0.5 MG/DL
BILIRUBIN DIRECT+TOT PNL SERPL-MCNC: 0.2 MG/DL (ref 0–0.8)
BILIRUBIN DIRECT+TOT PNL SERPL-MCNC: 0.3 MG/DL (ref 0–0.5)
BUN SERPL-MCNC: 9.8 MG/DL (ref 8.9–20.6)
CALCIUM SERPL-MCNC: 9.2 MG/DL (ref 8.7–10.5)
CHLORIDE SERPL-SCNC: 92 MMOL/L (ref 98–107)
CO2 SERPL-SCNC: 21 MMOL/L (ref 22–29)
CREAT SERPL-MCNC: 0.73 MG/DL (ref 0.73–1.18)
D DIMER PPP IA.FEU-MCNC: 0.86 MCG/ML FEU
EOSINOPHIL # BLD AUTO: 0.1 X10(3)/MCL (ref 0–0.9)
EOSINOPHIL NFR BLD AUTO: 3 %
ERYTHROCYTE [DISTWIDTH] IN BLOOD BY AUTOMATED COUNT: 19 % (ref 11.5–14.5)
GLOBULIN SER-MCNC: 4 GM/DL (ref 2.4–3.5)
GLUCOSE SERPL-MCNC: 97 MG/DL (ref 74–100)
HCT VFR BLD AUTO: 29.3 % (ref 40–51)
HGB BLD-MCNC: 9.7 GM/DL (ref 13.5–17.5)
IMM GRANULOCYTES # BLD AUTO: 0.01 10*3/UL
IMM GRANULOCYTES NFR BLD AUTO: 0 %
LYMPHOCYTES # BLD AUTO: 1.7 X10(3)/MCL (ref 0.6–4.6)
LYMPHOCYTES NFR BLD AUTO: 54 %
MCH RBC QN AUTO: 21.9 PG (ref 26–34)
MCHC RBC AUTO-ENTMCNC: 33.1 GM/DL (ref 31–37)
MCV RBC AUTO: 66.3 FL (ref 80–100)
MONOCYTES # BLD AUTO: 0.3 X10(3)/MCL (ref 0.1–1.3)
MONOCYTES NFR BLD AUTO: 9 %
NEUTROPHILS # BLD AUTO: 0.97 X10(3)/MCL (ref 2.1–9.2)
NEUTROPHILS NFR BLD AUTO: 31 %
NRBC BLD AUTO-RTO: 0 % (ref 0–0.2)
PLATELET # BLD AUTO: 135 X10(3)/MCL (ref 130–400)
PMV BLD AUTO: 8.3 FL (ref 7.4–10.4)
POTASSIUM SERPL-SCNC: 3.7 MMOL/L (ref 3.5–5.1)
PROT SERPL-MCNC: 7.8 GM/DL (ref 6.4–8.3)
RBC # BLD AUTO: 4.42 X10(6)/MCL (ref 4.5–5.9)
SARS-COV-2 AG RESP QL IA.RAPID: NEGATIVE
SODIUM SERPL-SCNC: 126 MMOL/L (ref 136–145)
TROPONIN I SERPL-MCNC: 0.03 NG/ML (ref 0–0.04)
WBC # SPEC AUTO: 3.1 X10(3)/MCL (ref 4.5–11)

## 2021-11-12 LAB
ABS NEUT (OLG): 1.15 X10(3)/MCL (ref 2.1–9.2)
ALBUMIN SERPL-MCNC: 3.8 GM/DL (ref 3.5–5)
ALBUMIN/GLOB SERPL: 0.9 RATIO (ref 1.1–2)
ALP SERPL-CCNC: 126 UNIT/L (ref 40–150)
ALT SERPL-CCNC: 57 UNIT/L (ref 0–55)
AST SERPL-CCNC: 146 UNIT/L (ref 5–34)
BASOPHILS # BLD AUTO: 0.1 X10(3)/MCL (ref 0–0.2)
BASOPHILS NFR BLD AUTO: 2 %
BILIRUB SERPL-MCNC: 0.7 MG/DL
BILIRUBIN DIRECT+TOT PNL SERPL-MCNC: 0.3 MG/DL (ref 0–0.8)
BILIRUBIN DIRECT+TOT PNL SERPL-MCNC: 0.4 MG/DL (ref 0–0.5)
BUN SERPL-MCNC: 8.4 MG/DL (ref 8.9–20.6)
CALCIUM SERPL-MCNC: 9.4 MG/DL (ref 8.7–10.5)
CHLORIDE SERPL-SCNC: 97 MMOL/L (ref 98–107)
CK MB SERPL-MCNC: 1 NG/ML
CK MB SERPL-MCNC: 1.6 NG/ML
CK MB SERPL-MCNC: 2 NG/ML
CK SERPL-CCNC: 100 U/L (ref 30–200)
CK SERPL-CCNC: 142 U/L (ref 30–200)
CK SERPL-CCNC: 166 U/L (ref 30–200)
CO2 SERPL-SCNC: 24 MMOL/L (ref 22–29)
CREAT SERPL-MCNC: 0.77 MG/DL (ref 0.73–1.18)
EOSINOPHIL # BLD AUTO: 0 X10(3)/MCL (ref 0–0.9)
EOSINOPHIL NFR BLD AUTO: 2 %
ERYTHROCYTE [DISTWIDTH] IN BLOOD BY AUTOMATED COUNT: 19.5 % (ref 11.5–14.5)
ETHANOL SERPL-MCNC: 233 MG/DL
GLOBULIN SER-MCNC: 4.1 GM/DL (ref 2.4–3.5)
GLUCOSE SERPL-MCNC: 87 MG/DL (ref 74–100)
HCT VFR BLD AUTO: 31.5 % (ref 40–51)
HGB BLD-MCNC: 10.3 GM/DL (ref 13.5–17.5)
IMM GRANULOCYTES # BLD AUTO: 0.01 10*3/UL
IMM GRANULOCYTES NFR BLD AUTO: 0 %
LYMPHOCYTES # BLD AUTO: 0.8 X10(3)/MCL (ref 0.6–4.6)
LYMPHOCYTES NFR BLD AUTO: 34 %
MAGNESIUM SERPL-MCNC: 2.2 MG/DL (ref 1.6–2.6)
MCH RBC QN AUTO: 22.2 PG (ref 26–34)
MCHC RBC AUTO-ENTMCNC: 32.7 GM/DL (ref 31–37)
MCV RBC AUTO: 67.9 FL (ref 80–100)
MONOCYTES # BLD AUTO: 0.3 X10(3)/MCL (ref 0.1–1.3)
MONOCYTES NFR BLD AUTO: 13 %
NEUTROPHILS # BLD AUTO: 1.15 X10(3)/MCL (ref 2.1–9.2)
NEUTROPHILS NFR BLD AUTO: 48 %
NRBC BLD AUTO-RTO: 0 % (ref 0–0.2)
PHOSPHATE SERPL-MCNC: 2.8 MG/DL (ref 2.3–4.7)
PLATELET # BLD AUTO: 147 X10(3)/MCL (ref 130–400)
PMV BLD AUTO: 8.5 FL (ref 7.4–10.4)
POTASSIUM SERPL-SCNC: 4 MMOL/L (ref 3.5–5.1)
PROT SERPL-MCNC: 7.9 GM/DL (ref 6.4–8.3)
RBC # BLD AUTO: 4.64 X10(6)/MCL (ref 4.5–5.9)
SODIUM SERPL-SCNC: 133 MMOL/L (ref 136–145)
TROPONIN I SERPL-MCNC: 0.01 NG/ML (ref 0–0.04)
TROPONIN I SERPL-MCNC: 0.02 NG/ML (ref 0–0.04)
TROPONIN I SERPL-MCNC: 0.09 NG/ML (ref 0–0.04)
TROPONIN I SERPL-MCNC: <0.01 NG/ML (ref 0–0.04)
WBC # SPEC AUTO: 2.4 X10(3)/MCL (ref 4.5–11)

## 2022-02-23 ENCOUNTER — HISTORICAL (OUTPATIENT)
Dept: ENDOSCOPY | Facility: HOSPITAL | Age: 47
End: 2022-02-23

## 2022-02-23 LAB
ABS NEUT (OLG): 2.2 (ref 2.1–9.2)
ALBUMIN SERPL-MCNC: 3.6 G/DL (ref 3.5–5)
ALBUMIN/GLOB SERPL: 0.9 {RATIO} (ref 1.1–2)
ALP SERPL-CCNC: 119 U/L (ref 40–150)
ALT SERPL-CCNC: 38 U/L (ref 0–55)
AST SERPL-CCNC: 66 U/L (ref 5–34)
BASOPHILS # BLD AUTO: 0.1 10*3/UL (ref 0–0.2)
BASOPHILS NFR BLD AUTO: 2 %
BILIRUB SERPL-MCNC: 0.6 MG/DL
BILIRUBIN DIRECT+TOT PNL SERPL-MCNC: 0.3 (ref 0–0.5)
BILIRUBIN DIRECT+TOT PNL SERPL-MCNC: 0.3 (ref 0–0.8)
BUN SERPL-MCNC: 5.9 MG/DL (ref 8.9–20.6)
CALCIUM SERPL-MCNC: 9 MG/DL (ref 8.7–10.5)
CHLORIDE SERPL-SCNC: 96 MMOL/L (ref 98–107)
CO2 SERPL-SCNC: 27 MMOL/L (ref 22–29)
CREAT SERPL-MCNC: 0.69 MG/DL (ref 0.73–1.18)
EOSINOPHIL # BLD AUTO: 0 10*3/UL (ref 0–0.9)
EOSINOPHIL NFR BLD AUTO: 0 %
ERYTHROCYTE [DISTWIDTH] IN BLOOD BY AUTOMATED COUNT: 17.5 % (ref 11.5–14.5)
FLAG2 (OHS): 50
FLAG3 (OHS): 80
FLAGS (OHS): 80
GLOBULIN SER-MCNC: 4.2 G/DL (ref 2.4–3.5)
GLUCOSE SERPL-MCNC: 100 MG/DL (ref 74–100)
HCT VFR BLD AUTO: 30.1 % (ref 40–51)
HGB BLD-MCNC: 9.3 G/DL (ref 13.5–17.5)
ICTERIC INTERF INDEX SERPL-ACNC: 1
IMM GRANULOCYTES # BLD AUTO: 0.01 10*3/UL
IMM GRANULOCYTES NFR BLD AUTO: 0 %
IMM. NE 2 SUSPECT FLAG (OHS): 20
LIPEMIC INTERF INDEX SERPL-ACNC: 2
LOW EVENT # SUSPECT FLAG (OHS): 100
LYMPHOCYTES # BLD AUTO: 1.1 10*3/UL (ref 0.6–4.6)
LYMPHOCYTES NFR BLD AUTO: 28 %
MANUAL DIFF? (OHS): NO
MCH RBC QN AUTO: 21.8 PG (ref 26–34)
MCHC RBC AUTO-ENTMCNC: 30.9 G/DL (ref 31–37)
MCV RBC AUTO: 70.7 FL (ref 80–100)
MO BLASTS SUSPECT FLAG (OHS): 50
MONOCYTES # BLD AUTO: 0.4 10*3/UL (ref 0.1–1.3)
MONOCYTES NFR BLD AUTO: 12 %
NEUTROPHILS # BLD AUTO: 2.2 10*3/UL (ref 2.1–9.2)
NEUTROPHILS NFR BLD AUTO: 58 %
NRBC BLD AUTO-RTO: 0 % (ref 0–0.2)
PLATELET # BLD AUTO: 231 10*3/UL (ref 130–400)
PMV BLD AUTO: 7.9 FL (ref 7.4–10.4)
POTASSIUM SERPL-SCNC: 3.6 MMOL/L (ref 3.5–5.1)
PROT SERPL-MCNC: 7.8 G/DL (ref 6.4–8.3)
RBC # BLD AUTO: 4.26 10*6/UL (ref 4.5–5.9)
SODIUM SERPL-SCNC: 131 MMOL/L (ref 136–145)
WBC # SPEC AUTO: 3.8 10*3/UL (ref 4.5–11)

## 2022-04-11 ENCOUNTER — HISTORICAL (OUTPATIENT)
Dept: ADMINISTRATIVE | Facility: HOSPITAL | Age: 47
End: 2022-04-11

## 2022-04-24 VITALS
HEIGHT: 63 IN | BODY MASS INDEX: 22.23 KG/M2 | OXYGEN SATURATION: 96 % | WEIGHT: 125.44 LBS | SYSTOLIC BLOOD PRESSURE: 152 MMHG | DIASTOLIC BLOOD PRESSURE: 83 MMHG

## 2022-04-30 NOTE — OP NOTE
DATE OF SURGERY:    10/24/2017    SURGEON:  Larry Helm MD    attending physician:  Dr. Larry Helm MD    RESIDENT SURGEON:  Daniel Edge MD.    PROCEDURE PERFORMED:  Colonoscopy with biopsy.  Esophagogastroduodenoscopy.    PREOPERATIVE DIAGNOSIS:  Rectal bleeding, anemia, and melena.    POSTOPERATIVE DIAGNOSES:    1. Rectal and descending colon polyps and grade 3-4 internal hemorrhoids.    2. Normal esophagogastroduodenoscopy.    FINDINGS:    1. A 3 mm polyp in the distal descending, beginning sigmoid rectal area.  A 3 mm polyp in the sigmoid colon and rectum.  Grade 3-4 internal hemorrhoids.  2. Normal EGD.    COMPLICATIONS:  None.    SPECIMENS:  Descending colon, sigmoid colon and rectal polyps.    ESTIMATED BLOOD LOSS:  10 cc.    COMPLICATIONS:  None.    INDICATIONS FOR PROCEDURE:  This is a 41-year-old male who was admitted for a week in the hospital with severe microcytic anemia, melena and a hemoglobin of 4.  He was given 3 units of red cells to more closely delineate to the cause of his anemia.  He responded well to those.  He is back here for an elective colonoscopy to determine if there is any malignancy or concerning features in his colon for further evaluation.    PROCEDURE IN DETAIL:  After appropriate consents were signed, the patient was taken to the endoscopy suite.  An audible surgical time-out took place between Anesthesia, Nursing and Surgery.  The first portion of the procedure will be EGD.     The patient was placed in the left lateral decubitus position.  MAC anesthesia was initiated.  After appropriate anesthesia, the endoscope was inserted in the patient's oropharynx to evaluate the cords and the piriform sinus.  The piriform sinus was located.  The esophagus was intubated.  The scope was inserted all the way down to the squamocolumnar junction and GE junction.  During this time, 360 degrees of esophageal mucosa was evaluated and showed no erosions, esophagitis, or obvious  ulcerations.  The EG junction and lower esophageal sphincter at the squamocolumnar junction were also evaluated.  These were found to be normal.  The fundus, body and antrum of the stomach were entered and these were evaluated normal as well with no obvious erosions, after which point, the pylorus was located.  The 1st, 2nd, and 3rd portions of the duodenum were intubated and thoroughly evaluated.  There was no duodenitis.  The scope was gently removed and retroflexed to evaluate the fundus of the stomach as well as the lower esophageal sphincter and GE junction from below.  These were also found to be normal to the entire endoscopic exam.  The patient tolerated the procedure well.  There were no significant findings to account for the patient's anemia.  The scope was withdrawn.  The oropharynx was sucked out.  The patient tolerated the procedure well with no obvious complications.     After which point, the patient was turned over and the colonoscope was brought into the field.  An audible surgical time-out took place between Anesthesia, Nursing and Surgery.  The digital rectal exam showed some pretty significant grade 3-4 internal and external hemorrhoids as well, but no obvious masses palpated on digital rectal exam.  After which point, the scope was inserted into the rectum and passed all the way to the base of the cecum.     The prep was adequate to good.  After the cecum was reached the appendiceal orifice was located and the scope was slowly pulled back in a 360 degree fashion.  Overall, the colonic mucosa was relatively normal.  At the distal descending colon, proximal sigmoid colon, a 3 mm polyp was found.  This was biopsied completely.  In the middle sigmoid colon a 3 mm polyp was found and biopsied completely.  As we approached the rectum we began to see some fairly congested hemorrhoidal tissue and grade 3-4 hemorrhoids.  There was also an adjacent polyp at the distal rectum.  This was biopsied as well.   The scope was retroflexed to evaluate the hemorrhoidal tissue, which was again appreciated.  Ultimately the scope was pulled out through the anal canal, which showed no obvious masses, cancers, tumors, condylomata or papilloma.  The patient tolerated the procedure well with no obvious complications and was taken to PACU in good condition.    RECOMMENDATIONS:  I will definitely need to follow up the biopsy results from those 3 polyps, but at this point, the patient should be to be scoped in 3 years for further evaluation, considering we found 2     polyps and 1 in the distal rectum.  Refer the patient back to [facility] for further evaluation for their recommendations of these.        ______________________________  ANGELINE LLANES MD    ______________________________  MD ADRIANA Giron/HA  DD:  10/24/2017  Time:  04:21PM  DT:  10/24/2017  Time:  05:03PM  Job #:  [id number]

## 2022-04-30 NOTE — ED PROVIDER NOTES
Patient:   Alcon Taylor             MRN: [id number]            FIN: [id number]               Age:   43 years     Sex:  Male     :  1975   Associated Diagnoses:   Abnormal laboratory findings; Alcoholism, chronic; Anemia   Author:   Ivania GREEN, Bal Elias      Basic Information   Time seen: Date & time 2019 10:00:00.   History source: Patient.   Arrival mode: Private vehicle.   History limitation: Language barrier.   Additional information: Chief Complaint from Nursing Triage Note : Chief Complaint   2019 9:31 CDT        Chief Complaint           Pt sent here from clinic [facility] of low H/H.  Pt reports receiving blood but does not remember the exact date.  .      History of Present Illness   The patient presents with Anemia.  The onset was chronic.  The course/duration of symptoms is worsening.  Location: generalized. The degree at onset was moderate.  The degree at present is moderate.  Seen in clinic here, has known history of alcohol abuse, internal and external hemorrhoids, colonic polyps, previously known to be significantly anemic with iron deficiency and started on iron and vitamin C.  In the last 3 months, his hemoglobin has dropped quite a bit from 10 to just over 6 on labs this morning.  Called this morning to come in for transfusion.  He continues to work in construction, he reports that he generally feels well and is able to work, has not seen gross signs of bleeding recently, reports he continues to drink beer and admits to 8 or 9 most recently 2 days ago.  No chest pain, dyspnea, or other complaints.  Bulgarian-speaking, history obtained with the assistance of a video  and a language translation susan..     The patient presents with abnormal lab test.  Lab test value Lab test was performed by: primary care physician.  Associated symptoms: none.        Review of Systems   Constitutional symptoms:  Negative except as documented in HPI, no fever, no chills, no weakness.     Skin symptoms:  Negative except as documented in HPI, no rash, no breakdown.    Eye symptoms:  Negative except as documented in HPI, no recent vision problems, no pain.    ENMT symptoms:  Negative except as documented in HPI, no ear pain, no sore throat.    Respiratory symptoms:  Negative except as documented in HPI, no shortness of breath, no cough, no wheezing.    Cardiovascular symptoms:  Negative except as documented in HPI, no chest pain, no palpitations, no syncope.    Gastrointestinal symptoms:  Negative except as documented in HPI, no abdominal pain, no nausea, no vomiting, no diarrhea.    Genitourinary symptoms:  Negative except as documented in HPI, no dysuria, no hematuria.    Musculoskeletal symptoms:  Negative except as documented in HPI, no Muscle pain, no Joint pain.    Neurologic symptoms:  Negative except as documented in HPI, no altered level of consciousness, no weakness.    Psychiatric symptoms:  Negative except as documented in HPI, no anxiety, no depression.    Endocrine symptoms:  Negative except as documented in HPI, no polyuria, no polydipsia.    Hematologic/Lymphatic symptoms:  Negative except as documented in HPI, bleeding tendency negative, bruising tendency negative.    Allergy/immunologic symptoms:  Negative except as documented in HPI, no recurrent infections, no impaired immunity.              Additional review of systems information: All other systems reviewed and otherwise negative, All systems reviewed as documented in chart.      Health Status   Allergies:    Allergic Reactions (Selected)  No Known Allergies  No Known Medication Allergies.   Medications:  (Selected)   Inpatient Medications  Ordered  Triple Antibiotic Ointment topical: 1 susan, form: Ointment, TOP, Once, first dose 04/19/17 21:00:00 CDT, stop date 04/19/17 21:00:00 CDT, Waste Code [facility]  Prescriptions  Prescribed  Colace 100 mg oral capsule: 100 mg = 1 cap(s), Oral, BID, PRN PRN for constipation, # 60 cap(s), 6  Refill(s), Pharmacy: [facility]  Norvasc 10 mg oral tablet: 10 mg = 1 tab(s), Oral, Daily, # 30 tab(s), 6 Refill(s), Pharmacy: [facility]  Vitamin D3 50,000 intl units oral capsule: 50,000 IntUnit = 1 cap(s), Oral, qWeek, # 12 cap(s), 0 Refill(s), Pharmacy: [facility]  ascorbic acid 500 mg oral capsule: 500 mg = 1 cap(s), Oral, Daily, # 60 cap(s), 6 Refill(s), Pharmacy: [facility]  ferrous fumarate 324 mg (106 mg elemental iron) oral tablet: 324 mg = 1 tab(s), Oral, TID, # 90 tab(s), 6 Refill(s), Pharmacy: [facility]  folic acid 1 mg oral tablet: 1 mg = 1 tab(s), Oral, Daily, # 30 tab(s), 6 Refill(s), Pharmacy: [place] [id number]  hydrocortisone 25 mg rectal suppository: 25 mg = 1 supp, RI (rectal), BID, PRN PRN bleeding, # 28 supp, 6 Refill(s), Pharmacy: [facility]  losartan 25 mg oral tablet: 25 mg = 1 tab(s), Oral, Daily, # 30 tab(s), 6 Refill(s), Pharmacy: [facility]  lovastatin 10 mg oral tablet: 10 mg = 1 tab(s), Oral, Once a day (at bedtime), # 30 tab(s), 6 Refill(s), Pharmacy: [facility]  Documented Medications  Documented  Centrum Silver oral tablet: 1 tab(s), Oral, Daily, # 30 tab(s), 0 Refill(s).      Past Medical/ Family/ Social History   Medical history:    No active or resolved past medical history items have been selected or recorded..   Surgical history:    Esophagogastroduodenoscopy on 10/24/2017 at 41 Years.  Comments:  10/24/2017 12:47 KARMEN Vernon RN, Karrie AMADOR  auto-populated from documented surgical case  Colonoscopy on 10/24/2017 at 41 Years.  Comments:  10/24/2017 12:47 KARMEN Vernon RN, Karrie PADILLA.  auto-populated from documented surgical case  Biopsy Gastrointestinal on 10/24/2017 at 41 Years.  Comments:  10/24/2017 12:47 KARMEN Vernon RN, Karrie PADILLA.  auto-populated from documented surgical case  Denies..   Family history:    No family history items have been selected or recorded..   Social history:    Social & Psychosocial  Habits    Alcohol  10/02/2018  Use: Current    Type: Beer    Comment: STATES HAD 4 BEER SATURDAY - 10/02/2018 09:11 - Lori FLOWERSMaxine Tara    Employment/School  11/06/2017  Status: Employed    Description: Construction    Exercise    Comment: none - 03/08/2018 08:13 - Wally KRISTIESachia    Home/Environment  11/06/2017  Lives with: Friends    Nutrition/Health  03/08/2018  Type of diet: Regular    Caffeine intake amount: coke --2 a day    Wants to lose weight: No    Sleeping concerns: No    Feels highly stressed: No    Substance Use    Comment: denies - 04/19/2017 19:16 - Fletcher OLGUIN, Mabel CHARLES    Tobacco  07/02/2019  Use: Former smoker, quit more    Patient Wants Consult For Cessation Counseling N/A    Abuse/Neglect  07/02/2019  SHX Any signs of abuse or neglect No  .   Problem list:    Active Problems (3)  HTN (hypertension)   Tobacco user   Tobacco user   .      Physical Examination               Vital Signs   Vital Signs   7/2/2019 9:31 CDT        Temperature Oral          36.9 DegC                             Temperature Oral (calculated)             98.42 DegF                             Peripheral Pulse Rate     83 bpm                             Respiratory Rate          14 br/min                             SpO2                      100 %                             Oxygen Therapy            Room air                             Systolic Blood Pressure   158 mmHg  HI                             Diastolic Blood Pressure  82 mmHg  .   Measurements   7/2/2019 9:31 CDT        Weight Dosing             58.8 kg                             Weight Measured           58.8 kg                             Weight Measured and Calculated in Lbs     129.63 lb                             Height/Length Dosing      160.02 cm                             Height/Length Estimated   160.02 cm  .   Basic Oxygen Information   7/2/2019 9:31 CDT        SpO2                      100 %                             Oxygen Therapy             Room air  .   General:  Alert, no acute distress, Mildly tremulous.    Skin:  Warm, dry, normal for ethnicity.    Head:  Normocephalic, atraumatic.    Neck:  Supple, trachea midline, no tenderness.    Eye:  Pupils are equal, round and reactive to light, extraocular movements are intact, normal conjunctiva.    Ears, nose, mouth and throat:  Oral mucosa moist, no pharyngeal erythema or exudate.    Cardiovascular:  Regular rate and rhythm, No murmur, Normal peripheral perfusion, No edema.    Respiratory:  Lungs are clear to auscultation, respirations are non-labored, breath sounds are equal, Symmetrical chest wall expansion.    Chest wall:  No tenderness, No deformity.    Back:  Nontender, Normal range of motion, Normal alignment.    Musculoskeletal:  Normal ROM, normal strength, no tenderness, no swelling, no deformity.    Gastrointestinal:  Soft, Non distended, Normal bowel sounds, Mild bilateral upper abdominal tenderness - diffuse.    Neurological:  Alert and oriented to person, place, time, and situation, No focal neurological deficit observed, CN II-XII intact, normal motor observed, normal speech observed.    Lymphatics:  No lymphadenopathy.   Psychiatric:  Cooperative, appropriate mood & affect, normal judgment.       Medical Decision Making   Differential Diagnosis:  Electrolyte imbalance, anemia.    Documents reviewed:  Emergency department nurses' notes, emergency department records, prior records.    Results review:  Lab results : Lab View   7/2/2019 8:27 CDT        Sodium Lvl                137 mmol/L                             Potassium Lvl             3.8 mmol/L                             Chloride                  102 mmol/L                             CO2                       27 mmol/L                             Calcium Lvl               8.2 mg/dL  LOW                             Glucose Lvl               129 mg/dL  HI                             BUN                       4 mg/dL  LOW                              Creatinine                0.60 mg/dL                             eGFR-AA                   >105 mL/min                             eGFR-STEFANIE                  >105 mL/min                             Bili Total                0.6 mg/dL                             Bili Direct               0.2 mg/dL                             Bili Indirect             0.4 mg/dL                             AST                       94 unit/L  HI                             ALT                       65 unit/L                             Alk Phos                  108 unit/L                             Total Protein             7.1 gm/dL                             Albumin Lvl               3.2 gm/dL  LOW                             Globulin                  3.90 gm/mL  HI                             A/G Ratio                 0.8 ratio  LOW                             Chol                      197 mg/dL                             HDL                       113 mg/dL                             Trig                      71 mg/dL                             LDL                       70 mg/dL                             Chol/HDL                  1.7                             VLDL                      14 mg/dL                             WBC                       3.5 x10(3)/mcL  LOW                             RBC                       4.25 x10(6)/mcL  LOW                             Hgb                       6.8 gm/dL  CRIT                             Hct                       27.1 %  LOW                             Platelet                  166 x10(3)/mcL                             MCV                       63.8 fL  LOW                             MCH                       16.0 pg  LOW                             MCHC                      25.1 gm/dL  LOW                             RDW                       22.7 %  HI                             MPV                       8.2 fL                             Abs Neut                   2.23 x10(3)/mcL                             Neutro Auto               64 x10(3)/mcL  NA                             Lymph Auto                16 %                             Mono Auto                 16 %  HI                             Eos Auto                  1  NA                             Abs Eos                   0.02 x10(3)/mcL  NA                             Basophil Auto             2  NA                             Abs Neutro                2.23 x10(3)/mcL  NA                             Abs Lymph                 0.56 x10(3)/mcL  NA                             Abs Mono                  0.56 x10(3)/mcL  NA                             Abs Baso                  0.07 x10(3)/mcL  NA                             IG%                       1 %  NA                             IG#                       0.0200  NA  .      Impression and Plan   Diagnosis   Abnormal laboratory findings (PNED [id number])   Alcoholism, chronic (BSR29-WV F10.20)   Anemia (DOX76-EK D64.9)      Calls-Consults   -  7/2/2019 10:32:00 , Int.. Med./ Fam. Med., recommends Dr. Broussard/  Dr. Patel to see & admit.    Plan   Disposition: Admit time  7/2/2019 10:33:00, Place in Observation Unit, Dr. Patel - Fam. Med..

## 2022-04-30 NOTE — H&P
Patient:   Alcon Taylor             MRN: [id number]            FIN: [id number]               Age:   43 years     Sex:  Male     :  1975   Associated Diagnoses:   None   Author:   Radha Edwards DO      Chief Complaint   2019 9:31 CDT        Pt sent here from clinic bacause of low H/H.  Pt reports receiving blood but does not remember the exact date.        History of Present Illness   42yo non English speaking  male presented to ED for abnormal lab values drawn at clinic this am. H&P taken and translated by Dr. Patel. Pt was called from clinic for abnormal labs and was admitted for anemia. Pt has been followed by clinic for iron deficiency anemia, in April his Hgb was 10. Pt had episode of shortness of breath for approximately 1 minute while working last week. Pt reports back pain while working cutting wood a couple days ago. Pt denies any other symptoms and has not experienced shortness of breath or pain today. Pt has a history of alcohol abuse. Pt drinks around 12 beers daily, last drink was . Pt had colonoscopy and EGD in , an adenomatous polyp was found. No repeat colonoscopy done. Pt denies taking NSAIDS.    PMH: HTN, hospitalization for anemia w/ polyps 1yr ago  PSH: none  Allergies: nkda  Medications: amlodipine 10mg, lovastatin 10mg, losartan 25mg  Family Hx: Mom , Dad   Social Hx: Drinks about 12 beers daily, former cocaine user, former 12pack year smoker quit 1yr ago, works construction      ED course:  Pt given banana bag, Ativan 2mg PO, Ativan 4mg IV. ED ordered further labs and transfusion, labs still pending.      Review of Systems   Constitutional:  No fever, No chills, No fatigue.    Eye:  No recent visual problem.    Respiratory:  No shortness of breath.    Cardiovascular:  No chest pain, No palpitations, No peripheral edema.    Gastrointestinal:  No nausea, no vomiting, no diarrhea no constipation, No abdominal pain. No blood in  stool.  Endocrine:  No excessive thirst, No polyuria, No excessive hunger.    Musculoskeletal: Occasional back pain     Neurologic: No focal deficits. No headache.  Psychiatric: Anxiety.       Health Status   Current medications:  (Selected)   Inpatient Medications  Ordered  Triple Antibiotic Ointment topical: 1 susan, form: Ointment, TOP, Once, first dose 04/19/17 21:00:00 CDT, stop date 04/19/17 21:00:00 CDT, Waste Code BK  Prescriptions  Prescribed  Colace 100 mg oral capsule: 100 mg = 1 cap(s), Oral, BID, PRN PRN for constipation, # 60 cap(s), 6 Refill(s), Pharmacy: [facility]  Norvasc 10 mg oral tablet: 10 mg = 1 tab(s), Oral, Daily, # 30 tab(s), 6 Refill(s), Pharmacy: [facility]  Vitamin D3 50,000 intl units oral capsule: 50,000 IntUnit = 1 cap(s), Oral, qWeek, # 12 cap(s), 0 Refill(s), Pharmacy: [facility]  ascorbic acid 500 mg oral capsule: 500 mg = 1 cap(s), Oral, Daily, # 60 cap(s), 6 Refill(s), Pharmacy: [facility]  ferrous fumarate 324 mg (106 mg elemental iron) oral tablet: 324 mg = 1 tab(s), Oral, TID, # 90 tab(s), 6 Refill(s), Pharmacy: [facility]  folic acid 1 mg oral tablet: 1 mg = 1 tab(s), Oral, Daily, # 30 tab(s), 6 Refill(s), Pharmacy: [facility]  hydrocortisone 25 mg rectal suppository: 25 mg = 1 supp, MS (rectal), BID, PRN PRN bleeding, # 28 supp, 6 Refill(s), Pharmacy: [facility]  losartan 25 mg oral tablet: 25 mg = 1 tab(s), Oral, Daily, # 30 tab(s), 6 Refill(s), Pharmacy: [facility]  lovastatin 10 mg oral tablet: 10 mg = 1 tab(s), Oral, Once a day (at bedtime), # 30 tab(s), 6 Refill(s), Pharmacy: [facility]  Documented Medications  Documented  Centrum Silver oral tablet: 1 tab(s), Oral, Daily, # 30 tab(s), 0 Refill(s)      Physical Examination      Vital Signs (last 24 hrs)_____  Last Charted___________  Temp Oral     36.9 DegC  (JUL 02 09:31)  Heart Rate Peripheral   75 bpm  (JUL 02 11:21)  Resp Rate          18 br/min  (JUL 02 11:33)  SBP      H 149mmHg  (JUL 02 11:21)  DBP      81 mmHg  (JUL 02 11:21)  SpO2      99 %  (JUL 02 11:33)  Weight      58.8 kg  (JUL 02 09:31)     Measurements from flowsheet : Measurements   7/2/2019 9:31 CDT        Weight Dosing             58.8 kg                             Weight Measured           58.8 kg                             Weight Measured and Calculated in Lbs     129.63 lb                             Height/Length Dosing      160.02 cm                             Height/Length Estimated   160.02 cm     General: appears well, in no acute distress  Eye: yellow discoloration of the sclera  Neck: supple  Respiratory: clear to auscultation bilaterally  Cardiovascular: regular rate and rhythm 3/6 murmur  Gastrointestinal: soft, mild tenderness patient laughs and reports ticklish  Rectal Exam: visible hemorrhoids, no masses/nodules felt on OLY, no visible blood, -FOBT  Musculoskeletal: 5/5 strength shoulders and hips  Neurological: tremors bilateral hands         Review / Management   Labs Last 24 Hours   Chemistry  Hematology/Coagulation    Magnesium Lvl: 1.4 mg/dL Low (07/02/19 10:31:00) Retic Cnt Auto: 3.2 % High (07/02/19 10:31:00)   Amylase Lvl: 104 unit/L (07/02/19 10:31:00) RET#: 0.14 x10(6)/mcL High (07/02/19 10:31:00)   Ammonia Lvl: 20 mmol/L (07/02/19 10:31:00)    Iron Lvl: 11 mcg/dL Low (07/02/19 10:31:00)    Transferrin: 273 mg/dL (07/02/19 10:31:00)    TIBC: 359 mcg/dL (07/02/19 10:31:00)    Iron Sat: 3.1 % Low (07/02/19 10:31:00)    Ferritin Lvl: 4.4 ng/mL Low (07/02/19 10:31:00)    Folate Lvl: 15.5 ng/mL (07/02/19 10:31:00)    Lipase Lvl: 198 unit/L (07/02/19 10:31:00)    Vitamin B12 Lvl: 918 pg/mL (07/02/19 10:31:00)          Impression and Plan   42yo  male pt admitted for anemia and alcohol withdrawal.   1. Anemia   -transfusing 2 units repeat H/H on completion   -holding homes meds, continuing Amlodipine   -NPO will reassess around 1700  2. Alcohol  Withdrawal   -monitor tremors   -PRN Ativan 2mg IV    -banana bag daily   -given Mg recheck AM  3. Proteinuria   -AM urine protein creatinine ratio  4. Back Pain   -tylenol PRN    PPX: SCDs  CODE STATUS: Full code

## 2022-04-30 NOTE — ED PROVIDER NOTES
"   Patient:   Alcon Taylor             MRN: [id number]            FIN: [id number]               Age:   41 years     Sex:  Male     :  1975   Associated Diagnoses:   Leg pain; Anemia   Author:   Reinaldo Nunez      Basic Information   Time seen: Date & time 2017 13:27:00.   History source: Patient.   Arrival mode: Private vehicle.   History limitation: None.   Additional information: Chief Complaint from Nursing Triage Note : Chief Complaint   2017 13:15 CDT      Chief Complaint           40 y/o male with reports "cut self with saw 4 months ago" to right lower leg at [facility] ED, no follow up since. now c/o pain and infection to site. purulent drainage noted to dressing. denies fever  .      History of Present Illness   The patient presents with lower extremity pain and patient presents with a non-healing wound to the right lower leg at the site of a previously treated laceration.  The onset was chronic.  The course/duration of symptoms is fluctuating in intensity.  Type of injury: previously treated laceration.  The character of symptoms is pain.  The degree at present is minimal.  The exacerbating factor is walking.  The relieving factor is none.  Risk factors consist of not deep vein thrombosis, not pulmonary embolism, not anticoagulated, not diabetes mellitus, not coronary artery disease, not hypertension, not birth control pills, not immunocompromised patient, no peripheral neuropathy, not recent surgery, not pregnancy, not recent hospitalization, not recent illness/injury, no recent travel and not immobility.  Prior episodes: chronic.  Therapy today: none.  Associated symptoms: denies fever, denies chills, denies rash, denies edema, denies chest pain, denies shortness of breath and denies back pain.        Review of Systems   Constitutional symptoms:  Negative except as documented in HPI.   Skin symptoms:  Negative except as documented in HPI.   Eye symptoms:  Negative except as " documented in HPI.   ENMT symptoms:  Negative except as documented in HPI.   Respiratory symptoms:  Negative except as documented in HPI.   Cardiovascular symptoms:  Negative except as documented in HPI.   Gastrointestinal symptoms:  Negative except as documented in HPI.   Genitourinary symptoms:  Negative except as documented in HPI.   Musculoskeletal symptoms:  Reports: Lower leg, pain.   Neurologic symptoms:  Negative except as documented in HPI.   Psychiatric symptoms:  Negative except as documented in HPI.   Endocrine symptoms:  Negative except as documented in HPI.   Hematologic/Lymphatic symptoms:  Negative except as documented in HPI.   Allergy/immunologic symptoms:  Negative except as documented in HPI.             Additional review of systems information: All other systems reviewed and otherwise negative.      Health Status   Allergies:    Allergic Reactions (Selected)  No Known Medication Allergies,    Allergies (1) Active Reaction  No Known Medication Allergies None Documented  .   Medications:  (Selected)   Inpatient Medications  Ordered  Triple Antibiotic Ointment topical: 1 susan, form: Ointment, TOP, Once, first dose 04/19/17 21:00:00 CDT, stop date 04/19/17 21:00:00 CDT, Waste Code BKC  Prescriptions  Prescribed  Bactrim  mg-160 mg oral tablet: 1 tab(s), Oral, BID, drink plenty of fluids, # 14 tab(s), 0 Refill(s)  Norco 7.5 mg-325 mg oral tablet: 1 tab(s), Oral, q6hr, PRN PRN as needed for pain, not to exceed 8 tablets/day, # 12 tab(s), 0 Refill(s).   Immunizations: Per nurse's notes.      Past Medical/ Family/ Social History   Medical history:    No active or resolved past medical history items have been selected or recorded..   Surgical history:    No active procedure history items have been selected or recorded..   Family history:    No family history items have been selected or recorded..   Social history: Alcohol use: Regularly, drinks 12 servings of alcohol daily, history of alcohol abuse,  Tobacco use: Regularly, Drug use: Denies.   Problem list:    Active Problems (1)  Tobacco user   .      Physical Examination               Vital Signs   Vital Signs   9/16/2017 13:15 CDT      Temperature Oral          36.9 DegC                             Peripheral Pulse Rate     86 bpm                             Respiratory Rate          18 br/min                             SpO2                      99 %                             Oxygen Therapy            Room air                             Systolic Blood Pressure   138 mmHg                             Diastolic Blood Pressure  79 mmHg  .      Vital Signs (last 24 hrs)_____  Last Charted___________  Temp Oral     36.9 DegC  (SEP 16 13:15)  Heart Rate Peripheral   86 bpm  (SEP 16 13:15)  Resp Rate         18 br/min  (SEP 16 13:15)  SBP      138 mmHg  (SEP 16 13:15)  DBP      79 mmHg  (SEP 16 13:15)  SpO2      99 %  (SEP 16 13:15)  Weight      63.55 kg  (SEP 16 13:15)  .   Measurements   9/16/2017 13:15 CDT      Weight Dosing             63.55 kg                             Weight Measured           63.55 kg                             Weight Measured and Calculated in Lbs     140.10 lb                             Height/Length Dosing      165 cm                             Height/Length Estimated   165 cm  .   Basic Oxygen Information   9/16/2017 13:15 CDT      SpO2                      99 %                             Oxygen Therapy            Room air  .   General:  Alert, no acute distress.    Skin:  No pallor, no rash, normal for ethnicity.    Head:  Normocephalic.   Neck:  Supple, trachea midline.    Eye:  Extraocular movements are intact, normal conjunctiva.    Ears, nose, mouth and throat:  Oral mucosa moist, no pharyngeal erythema or exudate.    Cardiovascular:  Regular rate and rhythm, No edema, Arterial pulses: Bilateral, lower extremity, posterior tibial, normal.    Respiratory:  Lungs are clear to auscultation, respirations are non-labored, breath  sounds are equal, Symmetrical chest wall expansion.    Gastrointestinal:  Soft, Nontender, Non distended, Normal bowel sounds.    Back:  Nontender, Normal range of motion.    Musculoskeletal:  Normal ROM, normal strength, no swelling, no deformity, Lower extremity: Right, medial, tibia, tenderness, abrasion, erythema.    Neurological:  Alert and oriented to person, place, time, and situation, No focal neurological deficit observed, CN II-XII intact, normal sensory observed, normal motor observed.    Psychiatric:  Cooperative, appropriate mood & affect, normal judgment, non-suicidal.       Medical Decision Making   Orders  Launch Orders   Laboratory:  BMP (Order): Stat collect, 9/16/2017 13:28 CDT, Blood, Lab Collect, 9/16/2017 13:28 CDT  CBC w/ Auto Diff (Order): Now collect, 9/16/2017 13:28 CDT, Blood, Lab Collect, 9/16/2017 13:28 CDT  Radiology:  XR Tibia-Fibula Right 2 Views (Order): Stat, 9/16/2017 13:28 CDT, Pain, None, Wheelchair, Rad Type, Launch Orders   Laboratory:  CMP (Order): Stat collect, 9/16/2017 14:04 CDT, Blood, Lab Collect, 9/16/2017 14:04 CDT  CBC w/ Auto Diff (Order): Now collect, 9/16/2017 14:04 CDT, Blood, Lab Collect, 9/16/2017 14:04 CDT  Patient Care:  Saline Lock Insert (Order): 9/16/2017 14:04 CDT  Pharmacy:  Normal Saline (0.9% NS) IV 1000 mL (Order): 1,000 mL, 1,000 mL, IV, 1,000 mL/hr, start date 9/16/2017 14:04 CDT.    Results review:  Lab results : Lab View   9/16/2017 15:19 CDT      ABORh Auto Intp           O POS                             ABSC Auto Intrp           Negative                             Crossmatch                Compatible                             Crossmatch                Compatible                             Product Ready             2 LRBC Ready    9/16/2017 14:05 CDT      Sodium Lvl                138 mmol/L                             Potassium Lvl             3.9 mmol/L                             Chloride                  106 mmol/L                              CO2                       22 mmol/L                             Calcium Lvl               8.0 mg/dL  LOW                             Glucose Lvl               101 mg/dL                             BUN                       3 mg/dL  LOW                             Creatinine                0.60 mg/dL                             eGFR-AA                   >105 mL/min                             eGFR-STEFANIE                  >105 mL/min                             Bili Total                0.8 mg/dL                             Bili Direct               0.3 mg/dL                             Bili Indirect             0.5 mg/dL                             AST                       59 unit/L  HI                             ALT                       39 unit/L                             Alk Phos                  71 unit/L                             Total Protein             7.4 gm/dL                             Albumin Lvl               3.7 gm/dL                             Globulin                  3.70 gm/mL  HI                             A/G Ratio                 1 ratio                             WBC                       4.6 x10(3)/mcL                             RBC                       3.56 x10(6)/mcL  LOW                             Hgb                       4.6 gm/dL  CRIT                             Hct                       19.8 %  CRIT                             Platelet                  184 x10(3)/mcL                             MCV                       55.6 fL  LOW                             MCH                       12.9 pg  LOW                             MCHC                      23.2 gm/dL  LOW                             RDW                       23.7 %  HI                             MPV                       8.6 fL                             Abs Neut                  1.99 x10(3)/mcL  LOW                             Segs Man                  41 %  LOW                             Band Man                   1 %                             Lymph Man                 34.0 %                             Monocyte Man              13 %  HI                             Eos Man                   7 %                             Basophil Man              3 %  HI                             Myelo Man                 1 %  NA                             Hypochrom                 2+                             Platelet Est              Adequate                             Anisocyte                 2+                             Poik                      1+                             Polychrom                 1+                             RBC Morph                 Abnormal    9/16/2017 13:39 CDT      Sodium Lvl                137 mmol/L                             Potassium Lvl             3.8 mmol/L                             Chloride                  105 mmol/L                             CO2                       25 mmol/L                             Calcium Lvl               8.1 mg/dL  LOW                             Glucose Lvl               112 mg/dL  HI                             BUN                       4 mg/dL  LOW                             Creatinine                0.60 mg/dL                             eGFR-AA                   >105 mL/min                             eGFR-STEFANIE                  >105 mL/min    ,    No qualifying data available.    Radiology results:    XR Tibia-Fibula Right 2 Views Event Date: 9/16/2017 14:17:21 CDT Updated: 9/16/2017 14:23 CDT  XR Tibia-Fibula Right 2 Views  This document has an image    Reason For Exam  Pain  Radiology Report     History.  Pain.     Reference.  None Available.     Findings.  2 views of the right tibia and fibula demonstrate no fracture or  dislocation.     Impression.  No acute abnormality.     Signature Line  Electronically Signed By: Bal Peña MD  Date/Time Signed: 09/16/2017 14:21  .      Impression and Plan   Diagnosis   Leg pain (OVG68-BM  M79.606)   Anemia (HHH90-ZO D64.9)      Calls-Consults   -  2017 16:05:00 , Santana GREEN, Fabian Pascal, Internal Medicine, consult, recommends ADMIT.    Plan   Condition: Stable.    Disposition: Admit time  2017 17:30:00, Admit to Inpatient Telemetry Unit, Dispositioned by: Time: 2017 17:15:00, [facility]    Patient was given the following educational materials   Follow up with   Counseled: Patient, Regarding diagnosis, Regarding diagnostic results, Regarding treatment plan.       Addendum   I performed a face to face evaluation of this Pt, Ankita Taylor and my physical exam reveal/history revealed the followiny/o  male intially presented with non healing leg ulcer, Upon further workup, pt H/H 4.6/19.8 therefore pt needs a blood transfusion and will be admitted. Pt refused a rectal exam. Pt admits t o drinking a 12pack of beer daily. Denies rectal bleeding, hemetemesis. Amharic intrepreter used to communicate with pt history, physical, diagnosis, and treatment plan. Pt verbalized understanding. This case was initially evaluated by Reinaldo Dunlap under my supervision and I agree with his documentation, procedures and plan. I personally performed the Medical Decision Making for this patient

## 2022-04-30 NOTE — ED PROVIDER NOTES
Patient:   Alcon Taylor             MRN: [id number]            FIN: [id number]               Age:   46 years     Sex:  Male     :  1975   Associated Diagnoses:   Chest pain   Author:   Barrett Tapia MD      Basic Information   Time seen: Immediately upon arrival.   History source: Patient.   History limitation: Language barrier,  used..   Additional information: Chief Complaint from Nursing Triage Note : Chief Complaint   2021 21:25 CST     Chief Complaint           Pt c/o left sided chest pain started 1 hr pta. Pt talking freely, skin w/d. He drank alcohol 6pk pta.  .   Provider/Visit info:   Time Seen:  Barrett Tapia MD / 2021 21:32  .   History of Present Illness   46-year-old male with past medical history of hypertension presenting there with chest pain.  He states he has a substernal stabbing pain.  He denies any exertional component to it at this time.  He denies any shortness of breath cough or other symptoms at this time.  He is a very poor historian.   The patient presents with chest pain.  The onset was just prior to arrival.  The course/duration of symptoms is fluctuating in intensity.  Location: substernal. Radiating pain: none. The character of symptoms is sharp.  The degree at onset was moderate.  The degree at maximum was moderate.        Review of Systems   Constitutional symptoms:  No fever, no chills.    Skin symptoms:  No jaundice,    Eye symptoms:  Vision unchanged.   Respiratory symptoms:  No shortness of breath, no cough.    Cardiovascular symptoms:  No chest pain, no syncope.    Gastrointestinal symptoms:  No abdominal pain, no nausea, no vomiting, no diarrhea.    Genitourinary symptoms:  No dysuria,    Neurologic symptoms:  No altered level of consciousness,              Additional review of systems information: All other systems reviewed and otherwise negative.      Health Status   Allergies:    Allergic Reactions (Selected)  No  Known Allergies  No Known Medication Allergies,    Allergies (2) Active Reaction  No Known Allergies None Documented  No Known Medication Allergies None Documented  .   Medications:  (Selected)   Inpatient Medications  Ordered  Triple Antibiotic Ointment topical: 1 susan, form: Ointment, TOP, Once, first dose 04/19/17 21:00:00 CDT, stop date 04/19/17 21:00:00 CDT, Waste Code BKC  Prescriptions  Prescribed  hydrochlorothiazide 25 mg oral tablet: 25 mg = 1 tab(s), Oral, Daily, # 90 tab(s), 1 Refill(s), Pharmacy: [facility], 160, cm, Height/Length Dosing, 10/13/21 10:37:00 CDT, 56.9, kg, Weight Dosing, 10/13/21 10:37:00 CDT  lisinopril 20 mg oral tablet: 20 mg = 1 tab(s), Oral, Daily, # 90 tab(s), 1 Refill(s), Pharmacy: [facility], 160, cm, Height/Length Dosing, 10/13/21 10:37:00 CDT, 56.9, kg, Weight Dosing, 10/13/21 10:37:00 CDT.      Past Medical/ Family/ Social History   Medical history:    No active or resolved past medical history items have been selected or recorded..   Surgical history:    Esophagogastroduodenoscopy on 7/21/2021 at 45 Years.  Comments:  7/21/2021 11:44 Donna Wan  auto-populated from documented surgical case  Biopsy Gastrointestinal on 7/21/2021 at 45 Years.  Comments:  7/21/2021 11:44 Donna Wan  auto-populated from documented surgical case  Esophagogastroduodenoscopy on 10/24/2017 at 41 Years.  Comments:  10/24/2017 12:47 Karrie Ortiz RN  auto-populated from documented surgical case  Colonoscopy on 10/24/2017 at 41 Years.  Comments:  10/24/2017 12:47 Karrie Ortiz RN  auto-populated from documented surgical case  Biopsy Gastrointestinal on 10/24/2017 at 41 Years.  Comments:  10/24/2017 12:47 Karrie Ortiz RN  auto-populated from documented surgical case  Denies..   Family history:    No family history items have been selected or recorded..   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    Exercise    Comment: none - 03/08/2018 08:13 - Babita Lo LPN    Home/Environment  11/06/2017  Lives with: Friends    Nutrition/Health  03/08/2018  Type of diet: Regular    Caffeine intake amount: coke --2 a day    Wants to lose weight: No    Sleeping concerns: No    Feels highly stressed: No    Substance Use  11/11/2021  Use: Never    Tobacco  11/11/2021  Use: Former smoker, quit more    Patient Wants Consult For Cessation Counseling N/A    Concerns about tobacco use in household: No    Smokeless tobacco use: Never    Abuse/Neglect  11/11/2021  SHX Any signs of abuse or neglect No    Feels unsafe at home: No    Safe place to go: Yes  .   Problem list:    Active Problems (3)  HTN (hypertension)   Tobacco user   Tobacco user   .      Physical Examination               Vital Signs   Vital Signs   11/11/2021 21:25 CST     Temperature Temporal Artery               36.8 DegC                             Peripheral Pulse Rate     74 bpm                             Respiratory Rate          17 br/min                             SpO2                      96 %                             Oxygen Therapy            Room air                             Systolic Blood Pressure   138 mmHg                             Diastolic Blood Pressure  85 mmHg  .      Vital Signs (last 24 hrs)_____  Last Charted___________  Heart Rate Peripheral   74 bpm  (NOV 11 21:25)  Resp Rate         17 br/min  (NOV 11 21:25)  SBP      138 mmHg  (NOV 11 21:25)  DBP      85 mmHg  (NOV 11 21:25)  SpO2      96 %  (NOV 11 21:25)  Height      166 cm  (NOV 11 21:25)  .   General:  Alert, no acute distress.    Skin:  Warm, dry.    Head:  Normocephalic, atraumatic.    Neck:  Supple, no tenderness.    Cardiovascular:  Regular rate and rhythm, No murmur.    Respiratory:  Lungs are clear to auscultation, respirations are  non-labored.    Back:  Nontender, Normal range of motion.    Musculoskeletal:  Normal ROM.   Chest wall   Gastrointestinal:  Soft, Nontender, Non distended.    Neurological:  Alert and oriented to person, place, time, and situation.   Lymphatics   Psychiatric:  Cooperative.      Medical Decision Making   Electrocardiogram:  Time 11/11/2021 21:31:00, rate 67, normal sinus rhythm, no ectopy, normal OR & QRS intervals, EP Interp, T wave Inversion, New inferior T wave inversions in lead III and aVF..    Results review:     No qualifying data available.   Pt presents to the ED with complaints of chest pain     Differential considerations include: ACS, PE, PTX, PNA, Aortic dissection, pericarditis  Evaluated the patient in the emergency department.  She was stable and well-appearing.  Had a normal cardiopulmonary examination.  His chest x-ray was unremarkable.  His EKG showed new inferior T wave inversions.  His troponin was negative.  His D-dimer was mildly elevated so a CT PE was done and showed no mass or pulmonary embolus.  Spoke with hospital medicine at this point for admission.  He is being admitted to the hospital in stable condition.      Impression and Plan   Diagnosis   Chest pain (PNED [id number])      Calls-Consults   -  11/12/2021 01:05:00 , hospital medicine, phone call.    Plan   Disposition: Admit time  11/12/2021 01:05:00, Admit to Inpatient Unit.

## 2022-05-05 NOTE — HISTORICAL [facility] CERNER
This is a historical note converted from [employer]. Formatting and pictures may have been removed.  Please reference [employer] for original formatting and attached multimedia. Admit and Discharge Dates  Admit Date: 11/11/2021  Discharge Date: 11/12/2021  Physicians  Attending Physician - Ariadna GREEN, Gwyn ROSALES  Admitting Physician - Ariadna GREEN, Gwyn ROSALES  Primary Care Physician - Char Jj  Discharge Diagnosis  Atypical chest pain  Surgical Procedures  No procedures recorded for this visit.  Immunizations  No immunizations recorded for this visit.  Admission Information  Mr. Taylor is a 46-year-old  male with PMH of HTN, HLD, chronic microcytic anemia, chronic alcohol use disorder, former tobacco use who presents to the ED today with complaints of chest pain has been going on for the past 1 day. ? services were used. ?He stated was sudden onset, substernal, lasted for an hour and was sharp in nature. ?Denies any radiation of his pain. ?Associated with shortness of breath, headache but denies any diaphoresis, nausea/vomiting. ?Denies any fevers, chills, orthopnea, PND, dizziness/lightheadedness, syncope, palpitations, pedal edema. ?Stated he had one episode of bloody stools. ?He stated that he had couple of beers before his arrival to the ED. ?During the interview he seemed intoxicated. ?In the ER, vital signs stable. ?Labs significant for hyponatremia, leukopenia and chronic microcytic anemia. ?Initial troponin negative at 0.031 up trended to 0.091, no significant EKG changes. ?IM consulted for ACS rule out.  Hospital Course  During his stay, patient was found to have a positive troponin of 0.09?which then down trended to WNL.? EKG on admission depicted?T wave abnormalities in lead III?however resulting EKGs depicted no acute changes?in the ST segment.? Patients chest pain?resolved?by a.m. and he?received a nuclear stress test which depicted inferior ischemia.? Patient was started on daily aspirin  81 mg, daily atorvastatin 20 mg, daily metoprolol succinate 25 mg, and as needed nitroglycerin tablets.? Patient has been given referral for cardiology follow-up and post wards follow-up with internal medicine in 2 weeks.? Verbal instructions were given via  to patient as he is American-speaking only and written instructions were provided for patients caregiver with plan and follow-up.  Significant Findings  Accession:?[id number]  Order:?CT Angio Chest PE W Contrast  Report Dt/Tm:?11/12/2021 06:44  Report:?  CTA of the chest with coronal and sagittal MIPS images  ?  Clinical history is left-sided chest pain.  ?  Automatic exposure control.  ?  The DLP is 871.41.  ?  Mediastinum reveals no significant adenopathy. The thoracic aorta  appears intact. The visualized portion of the upper abdomen appears  normal. No infiltrates are seen. No peripheral nodules are noted.  There are no filling defects seen within the pulmonary arteries to  indicate an embolus.  ?  IMPRESSION: No pulmonary embolus is demonstrated.  ?  [place] concordance  ?  Accession:?[id number]  Order:?XR Chest 2 Views  Report Dt/Tm:?11/11/2021 22:23  Report:?  Chest 2 views  ?  Clinical history is chest pain  ?  ?  ?  Lungs are clear. Heart size is within normal limits. The costophrenic  angles are clear. There is vascular calcification noted.  ?  IMPRESSION: No acute disease is seen  ?  Time Spent on discharge  35 minutes  Objective  Vitals & Measurements  T:?37.1? ?C (Oral)? TMIN:?36.5? ?C (Oral)? TMAX:?37.5? ?C (Oral)? HR:?96(Peripheral)? RR:?22? BP:?131/70? SpO2:?98%? WT:?69?kg? BMI:?19.45?  Physical Exam  General: Alert. Responsive. Not in?acute distress.  HEENT: Normocephalic, Atraumatic, No?conjunctival?icterus  Neck:?No JVD  Pulm: CTAB.?no exp?wheezes. no crackles. symmetrical chest expansion.  Cardio:?RRR. no appreciable murmurs/gallops.  Abdomen: BS+, soft, non-distended, non-tender  Extremity:??no LE edema. good equal pulses  felt bilaterally in all extremities.  Neurologic: AAOx3. Responds to questions/commands appropriately.  MSK: No obvious deformities. Moving all extremities purposefully.  Skin:?Warm, dry and without rashes.?  Patient Discharge Condition  Stable, to home?with?Jefe  Discharge Disposition  Mr.Filomeno Taylor?is being discharged?home.  ?   Activity:?Return to normal activity. Recommend returning to?workin?4?days.  Diet:?Regular Diet  ?  Medications:  -Atorvastatin?20 mg?daily provided for chest pain for 30 days  -Metoprolol succinate?25 mg daily provided for chest pain for 30 days  -Nitroglycerin?as needed?provided for chest pain  -Continue other medications as previously prescribed  ?   Follow Ups:  -To be seen in [facility] with Firm?2?by Dr. Sheppard in 2 weeks  -Referral placed to cardiology?clinic, processing to be followed up upon?in [facility]  -The following labs are to be drawn at the [facility] visit: CBC, troponin, CMP  -The following imaging studies are to be ordered at the [facility] visit: EKG  ?  ?   The above information was discussed with?the patient?in clear terms?and written instructions were provided for his caregiver.?Hewasable to repeat the instructions to me in?hisown words. All questions answered. ED precautions provided.?At this time, patient is determined to have maximized benefits of IP hospitalization. Patient is discharged in stable condition with OP f/u recommendations and instructions. All questions answered, and patient and/or caregiver verbalized agreement with the POC. The patient was given return precautions prior to d/c including symptoms that should prompt return to ED or to call PCP. Total time spent of DC of 35 minutes.?   Discharge Medication Reconciliation  Prescribed  aspirin (aspirin 81 mg oral tablet, CHEWABLE)?81 mg, Oral, Daily  atorvastatin (atorvastatin 20 mg oral tablet)?20 mg, Oral, Daily  folic acid (folic acid 1 mg oral tablet)?1 mg, Oral,  Daily  nitroglycerin (nitroglycerin 0.4 mg sublingual TAB)?0.4 mg, SL, q5min, PRN chest pain  Continue  hydrochlorothiazide (hydrochlorothiazide 25 mg oral tablet)?25 mg, Oral, Daily  lisinopril (lisinopril 20 mg oral tablet)?20 mg, Oral, Daily  Discontinue  metoprolol (metoprolol succinate 25 mg oral capsule, extended release)?25 mg, Oral, Daily  Education and Orders Provided  Coronary Artery Disease, Male  Angina  Alcohol Abuse and Nutrition  Alcohol Use Disorder  Alcoholic Hepatitis  Discharge - 11/12/21 15:56:00 CST, Home, ok to discharge after meds to bed?  Follow up  Report to Emergency Department if symptoms return or worsen  Anytime the conditions worsen, return to clinic  [facility], within 2 days, on  ????Office will call w/follow up appointment  Car Seat Challenge  No Qualifying Data

## 2022-05-05 NOTE — HISTORICAL [facility] CERNER
This is a historical note converted from [employer]. Formatting and pictures may have been removed.  Please reference [employer] for original formatting and attached multimedia. Admit and Discharge Dates  Admit Date: 07/02/2019  Discharge Date: 07/03/2019  Physicians  Attending Physician - Alberto GREEN, Julia PADILLA  Admitting Physician - Alberto GREEN, Julia PADILLA  Primary Care Physician - Char Jj  Discharge Diagnosis  Abnormal laboratory findings?[id number]  Alcoholism, chronic?F10.20  Anemia?D64.9  External hemorrhoid?K64.4  Fecal occult blood test positive?R19.5  Hemorrhoids, internal?K64.8  Surgical Procedures  No procedures recorded for this visit.  Immunizations  No immunizations recorded for this visit.  Significant Findings  43-year-old with past medical history of alcohol abuse, hypertension, iron deficiency anemia, polyps presenting after called in by clinic for abnormal labs.??He was previously worked up for his anemia with a colonoscopy when he was admitted about a year ago again after incidental finding of anemia when labs obtained when he presented for minor cut on his right lower extremity. ?He did report a one-day history of hematochezia at that time. ?Subsequently had colonoscopy and EGD. ?EGD were unremarkable but colonoscopy showed adenomatous polyp and internal/external hemorrhoids, would have been due for repeat 2020 for screening. ?Denied any recurrence of hematochezia. ?His CBC and iron studies previously improved with oral iron but?had fallen gradually with hemoglobin 6.8,?from 10.3?4/2019, and from 15 on 10/2018. ?He denied any acute symptoms specifically denied?chest pain shortness of breath swelling?pain in any part of his body?bleeding hematemesis?diarrhea?constipation.? Reported compliance with iron did notice some darkening of his stool with the iron which was unchanged.? Incidentally however he was noted to have tremor?with heavy drinking history of 12 beers a day last  drink?2 days prior to admission.? He was admitted for?anemia and?alcohol withdrawal.  ?   His tremor responded well to 4 mg of IV Ativan.? Was given fluids with thiamine?and glucose. ?He was transfused 2 units with an appropriate rise in his?hemoglobin?to 8.6. Subsequently increased to 9.1. He remained asymptomatic with no?evidence of bleeding. On HD2 he was tolerating po, urinating normally, without further evidence withdrawal. He had no complaints. He was tolerating po with normal bowel bladder function. He was requesting to be discharged?home. Deemed stable for discharge with plan for outpatient EGD/colonoscopy in 1-2 weeks to determine source of his?anemia. Form completed and delivered to GI lab. He will need follow up with his PCP for incidentally discovered proteinuria. Extensive ED precautions given. Alcohol cessation encouraged.  ?  Time Spent on discharge  >40 minutes  Objective  Vitals & Measurements  T:?37.0? ?C (Oral)? TMIN:?36.7? ?C (Oral)? TMAX:?37.2? ?C (Oral)? HR:?83(Peripheral)? RR:?19? BP:?143/77? SpO2:?100%? WT:?58.8?kg?  Physical Exam  General: Alert and oriented, No acute distress.  Eye:? Extraocular movements are intact.  HENT: Normocephalic.  Neck: Supple  Respiratory: Lungs are clear to auscultation, Respirations are non-labored, Breath sounds are equal, Symmetrical chest wall expansion.  Cardiovascular: Normal rate, Regular rhythm, No murmur.  Gastrointestinal: Soft, Non-tender, Non-distended  Musculoskeletal: Normal range of motion.  Integumentary: Warm, Dry, Intact.  Neurologic: No focal deficits, interval resolution of tremor  Patient Discharge Condition  Stable, improved  Discharge Disposition  Follow up with GI lab 1-2 weeks for upper lower scopes. Follow up in [facility] clinic 1 week likely repeat CBC at that time, further evaluation of proteinuria.?Needs to decrease his drinking.?Continue iron. Precautions given.   Discharge Medication Reconciliation  Continue  amLODIPine (Norvasc 10  mg oral tablet)?10 mg, Oral, Daily  ascorbic acid (ascorbic acid 500 mg oral capsule)?500 mg, Oral, Daily  cholecalciferol (Vitamin D3 50,000 intl units oral capsule)?50,000 IntUnit, Oral, qWeek  docusate (Colace 100 mg oral capsule)?100 mg, Oral, BID, PRN for constipation  ferrous fumarate (ferrous fumarate 324 mg (106 mg elemental iron) oral tablet)?324 mg, Oral, TID  folic acid (folic acid 1 mg oral tablet)?1 mg, Oral, Daily  losartan (losartan 25 mg oral tablet)?25 mg, Oral, Daily  lovastatin (lovastatin 10 mg oral tablet)?10 mg, Oral, Once a day (at bedtime)  multivitamin with minerals (Centrum Silver oral tablet)?1 tab(s), Oral, Daily  Discontinue  hydrocortisone topical (hydrocortisone 25 mg rectal suppository)?25 mg, ID (rectal), BID, PRN bleeding  Education and Orders Provided  Anemia(Uzbek)  Discharge - 07/03/19 13:20:00 CDT, Home?  Follow up  [facility], within 1 week  ????in medicine [facility] with repeat cbc  [facility], within 1 to 2 weeks  ????for egd and colonoscopy, form completed in GI lab